# Patient Record
Sex: MALE | Race: BLACK OR AFRICAN AMERICAN | Employment: UNEMPLOYED | URBAN - METROPOLITAN AREA
[De-identification: names, ages, dates, MRNs, and addresses within clinical notes are randomized per-mention and may not be internally consistent; named-entity substitution may affect disease eponyms.]

---

## 2020-06-30 ENCOUNTER — HOSPITAL ENCOUNTER (EMERGENCY)
Age: 55
Discharge: HOME | End: 2020-06-30
Attending: EMERGENCY MEDICINE
Payer: SELF-PAY

## 2020-06-30 ENCOUNTER — APPOINTMENT (OUTPATIENT)
Dept: GENERAL RADIOLOGY | Age: 55
End: 2020-06-30
Attending: EMERGENCY MEDICINE
Payer: SELF-PAY

## 2020-06-30 ENCOUNTER — APPOINTMENT (OUTPATIENT)
Dept: CT IMAGING | Age: 55
End: 2020-06-30
Attending: EMERGENCY MEDICINE
Payer: SELF-PAY

## 2020-06-30 VITALS
DIASTOLIC BLOOD PRESSURE: 95 MMHG | TEMPERATURE: 97 F | OXYGEN SATURATION: 96 % | HEIGHT: 66 IN | HEART RATE: 81 BPM | WEIGHT: 185 LBS | RESPIRATION RATE: 16 BRPM | SYSTOLIC BLOOD PRESSURE: 149 MMHG | BODY MASS INDEX: 29.73 KG/M2

## 2020-06-30 DIAGNOSIS — I10 ESSENTIAL HYPERTENSION: Primary | ICD-10-CM

## 2020-06-30 DIAGNOSIS — R10.32 ABDOMINAL PAIN, LLQ (LEFT LOWER QUADRANT): ICD-10-CM

## 2020-06-30 LAB
ALBUMIN SERPL-MCNC: 3.6 G/DL (ref 3.4–5)
ALBUMIN/GLOB SERPL: 0.8 {RATIO} (ref 0.8–1.7)
ALP SERPL-CCNC: 159 U/L (ref 45–117)
ALT SERPL-CCNC: 37 U/L (ref 16–61)
ANION GAP SERPL CALC-SCNC: 5 MMOL/L (ref 3–18)
APPEARANCE UR: CLEAR
AST SERPL-CCNC: 27 U/L (ref 10–38)
BASOPHILS # BLD: 0 K/UL (ref 0–0.1)
BASOPHILS NFR BLD: 0 % (ref 0–2)
BILIRUB SERPL-MCNC: 0.3 MG/DL (ref 0.2–1)
BILIRUB UR QL: NEGATIVE
BUN SERPL-MCNC: 13 MG/DL (ref 7–18)
BUN/CREAT SERPL: 12 (ref 12–20)
CALCIUM SERPL-MCNC: 9.1 MG/DL (ref 8.5–10.1)
CHLORIDE SERPL-SCNC: 111 MMOL/L (ref 100–111)
CO2 SERPL-SCNC: 25 MMOL/L (ref 21–32)
COLOR UR: YELLOW
CREAT SERPL-MCNC: 1.07 MG/DL (ref 0.6–1.3)
DIFFERENTIAL METHOD BLD: ABNORMAL
EOSINOPHIL # BLD: 0.3 K/UL (ref 0–0.4)
EOSINOPHIL NFR BLD: 5 % (ref 0–5)
ERYTHROCYTE [DISTWIDTH] IN BLOOD BY AUTOMATED COUNT: 16 % (ref 11.6–14.5)
GLOBULIN SER CALC-MCNC: 4.6 G/DL (ref 2–4)
GLUCOSE SERPL-MCNC: 101 MG/DL (ref 74–99)
GLUCOSE UR STRIP.AUTO-MCNC: NEGATIVE MG/DL
HCT VFR BLD AUTO: 39.1 % (ref 36–48)
HGB BLD-MCNC: 12.9 G/DL (ref 13–16)
HGB UR QL STRIP: NEGATIVE
KETONES UR QL STRIP.AUTO: NEGATIVE MG/DL
LEUKOCYTE ESTERASE UR QL STRIP.AUTO: NEGATIVE
LIPASE SERPL-CCNC: 105 U/L (ref 73–393)
LYMPHOCYTES # BLD: 1.7 K/UL (ref 0.9–3.6)
LYMPHOCYTES NFR BLD: 31 % (ref 21–52)
MCH RBC QN AUTO: 24.3 PG (ref 24–34)
MCHC RBC AUTO-ENTMCNC: 33 G/DL (ref 31–37)
MCV RBC AUTO: 73.8 FL (ref 74–97)
MONOCYTES # BLD: 0.6 K/UL (ref 0.05–1.2)
MONOCYTES NFR BLD: 10 % (ref 3–10)
NEUTS SEG # BLD: 2.9 K/UL (ref 1.8–8)
NEUTS SEG NFR BLD: 54 % (ref 40–73)
NITRITE UR QL STRIP.AUTO: NEGATIVE
PH UR STRIP: 5.5 [PH] (ref 5–8)
PLATELET # BLD AUTO: 318 K/UL (ref 135–420)
PMV BLD AUTO: 8.8 FL (ref 9.2–11.8)
POTASSIUM SERPL-SCNC: 4.1 MMOL/L (ref 3.5–5.5)
PROT SERPL-MCNC: 8.2 G/DL (ref 6.4–8.2)
PROT UR STRIP-MCNC: NEGATIVE MG/DL
RBC # BLD AUTO: 5.3 M/UL (ref 4.7–5.5)
SODIUM SERPL-SCNC: 141 MMOL/L (ref 136–145)
SP GR UR REFRACTOMETRY: >1.03 (ref 1–1.03)
TROPONIN I SERPL-MCNC: <0.02 NG/ML (ref 0–0.04)
UROBILINOGEN UR QL STRIP.AUTO: 1 EU/DL (ref 0.2–1)
WBC # BLD AUTO: 5.4 K/UL (ref 4.6–13.2)

## 2020-06-30 PROCEDURE — 84484 ASSAY OF TROPONIN QUANT: CPT

## 2020-06-30 PROCEDURE — 74177 CT ABD & PELVIS W/CONTRAST: CPT

## 2020-06-30 PROCEDURE — 99284 EMERGENCY DEPT VISIT MOD MDM: CPT

## 2020-06-30 PROCEDURE — 85025 COMPLETE CBC W/AUTO DIFF WBC: CPT

## 2020-06-30 PROCEDURE — 93005 ELECTROCARDIOGRAM TRACING: CPT

## 2020-06-30 PROCEDURE — 74011636320 HC RX REV CODE- 636/320: Performed by: EMERGENCY MEDICINE

## 2020-06-30 PROCEDURE — 71046 X-RAY EXAM CHEST 2 VIEWS: CPT

## 2020-06-30 PROCEDURE — 81003 URINALYSIS AUTO W/O SCOPE: CPT

## 2020-06-30 PROCEDURE — 83690 ASSAY OF LIPASE: CPT

## 2020-06-30 PROCEDURE — 80053 COMPREHEN METABOLIC PANEL: CPT

## 2020-06-30 RX ORDER — LISINOPRIL AND HYDROCHLOROTHIAZIDE 12.5; 2 MG/1; MG/1
1 TABLET ORAL DAILY
Qty: 30 TAB | Refills: 2 | Status: SHIPPED | OUTPATIENT
Start: 2020-06-30 | End: 2020-07-21

## 2020-06-30 RX ADMIN — IOPAMIDOL 100 ML: 612 INJECTION, SOLUTION INTRAVENOUS at 12:27

## 2020-06-30 NOTE — ED PROVIDER NOTES
EMERGENCY DEPARTMENT HISTORY AND PHYSICAL EXAM    Date: 6/30/2020  Patient Name: Nirav Milan    History of Presenting Illness     Chief Complaint   Patient presents with    Hypertension         History Provided By: Patient    Additional History (Context): Nirav Milan is a 60 y.o. male with hypertension who presents with length of worsening left lower quadrant abdominal pain for the past several weeks. Had a left inguinal hernia repair performed approximately 2 months ago. Denies nausea vomiting diarrhea melena hematochezia or dysuria. Patient also reports being out of his blood pressure medications for a week and a half and does not remember the name of the medications he was prescribed and dispensed from the penal system where he has been incarcerated for 40 years. PCP: None        Past History     Past Medical History:  Past Medical History:   Diagnosis Date    Hypertension        Past Surgical History:  Past Surgical History:   Procedure Laterality Date    HX ORTHOPAEDIC      right arm       Family History:  No family history on file. Social History:  Social History     Tobacco Use    Smoking status: Heavy Tobacco Smoker     Packs/day: 0.50   Substance Use Topics    Alcohol use: Yes     Alcohol/week: 5.0 standard drinks     Types: 5 Cans of beer per week    Drug use: No       Allergies:  No Known Allergies      Review of Systems   Review of Systems   Constitutional: Negative for fatigue and fever. Respiratory: Negative for shortness of breath. Cardiovascular: Negative for chest pain. Gastrointestinal: Positive for abdominal pain. Negative for blood in stool, diarrhea, nausea and vomiting. All Other Systems Negative  Physical Exam     Vitals:    06/30/20 0838   BP: (!) 149/95   Pulse: 81   Resp: 16   Temp: 97 °F (36.1 °C)   SpO2: 96%   Weight: 83.9 kg (185 lb)   Height: 5' 6\" (1.676 m)     Physical Exam  Vitals signs and nursing note reviewed.    Constitutional:       General: He is not in acute distress. Appearance: He is well-developed. He is not ill-appearing, toxic-appearing or diaphoretic. HENT:      Head: Normocephalic and atraumatic. Neck:      Musculoskeletal: Normal range of motion and neck supple. Thyroid: No thyromegaly. Vascular: No carotid bruit. Trachea: No tracheal deviation. Cardiovascular:      Rate and Rhythm: Normal rate and regular rhythm. Heart sounds: Normal heart sounds. No murmur. No friction rub. No gallop. Pulmonary:      Effort: Pulmonary effort is normal. No respiratory distress. Breath sounds: Normal breath sounds. No stridor. No wheezing or rales. Chest:      Chest wall: No tenderness. Abdominal:      General: There is no distension. Palpations: Abdomen is soft. There is no mass. Tenderness: There is abdominal tenderness. There is no guarding or rebound. Comments: llq ttp   Musculoskeletal: Normal range of motion. Skin:     General: Skin is warm and dry. Coloration: Skin is not pale. Neurological:      Mental Status: He is alert. Psychiatric:         Speech: Speech normal.         Behavior: Behavior normal.         Thought Content: Thought content normal.         Judgment: Judgment normal.            Diagnostic Study Results     Labs -     Recent Results (from the past 12 hour(s))   METABOLIC PANEL, COMPREHENSIVE    Collection Time: 06/30/20 11:02 AM   Result Value Ref Range    Sodium 141 136 - 145 mmol/L    Potassium 4.1 3.5 - 5.5 mmol/L    Chloride 111 100 - 111 mmol/L    CO2 25 21 - 32 mmol/L    Anion gap 5 3.0 - 18 mmol/L    Glucose 101 (H) 74 - 99 mg/dL    BUN 13 7.0 - 18 MG/DL    Creatinine 1.07 0.6 - 1.3 MG/DL    BUN/Creatinine ratio 12 12 - 20      GFR est AA >60 >60 ml/min/1.73m2    GFR est non-AA >60 >60 ml/min/1.73m2    Calcium 9.1 8.5 - 10.1 MG/DL    Bilirubin, total 0.3 0.2 - 1.0 MG/DL    ALT (SGPT) 37 16 - 61 U/L    AST (SGOT) 27 10 - 38 U/L    Alk.  phosphatase 159 (H) 45 - 117 U/L Protein, total 8.2 6.4 - 8.2 g/dL    Albumin 3.6 3.4 - 5.0 g/dL    Globulin 4.6 (H) 2.0 - 4.0 g/dL    A-G Ratio 0.8 0.8 - 1.7     CBC WITH AUTOMATED DIFF    Collection Time: 06/30/20 11:02 AM   Result Value Ref Range    WBC 5.4 4.6 - 13.2 K/uL    RBC 5.30 4.70 - 5.50 M/uL    HGB 12.9 (L) 13.0 - 16.0 g/dL    HCT 39.1 36.0 - 48.0 %    MCV 73.8 (L) 74.0 - 97.0 FL    MCH 24.3 24.0 - 34.0 PG    MCHC 33.0 31.0 - 37.0 g/dL    RDW 16.0 (H) 11.6 - 14.5 %    PLATELET 678 839 - 774 K/uL    MPV 8.8 (L) 9.2 - 11.8 FL    NEUTROPHILS 54 40 - 73 %    LYMPHOCYTES 31 21 - 52 %    MONOCYTES 10 3 - 10 %    EOSINOPHILS 5 0 - 5 %    BASOPHILS 0 0 - 2 %    ABS. NEUTROPHILS 2.9 1.8 - 8.0 K/UL    ABS. LYMPHOCYTES 1.7 0.9 - 3.6 K/UL    ABS. MONOCYTES 0.6 0.05 - 1.2 K/UL    ABS. EOSINOPHILS 0.3 0.0 - 0.4 K/UL    ABS.  BASOPHILS 0.0 0.0 - 0.1 K/UL    DF AUTOMATED     TROPONIN I    Collection Time: 06/30/20 11:02 AM   Result Value Ref Range    Troponin-I, QT <0.02 0.0 - 0.045 NG/ML   LIPASE    Collection Time: 06/30/20 11:02 AM   Result Value Ref Range    Lipase 105 73 - 393 U/L   EKG, 12 LEAD, INITIAL    Collection Time: 06/30/20 11:07 AM   Result Value Ref Range    Ventricular Rate 70 BPM    Atrial Rate 70 BPM    P-R Interval 194 ms    QRS Duration 110 ms    Q-T Interval 398 ms    QTC Calculation (Bezet) 429 ms    Calculated P Axis 69 degrees    Calculated R Axis 12 degrees    Calculated T Axis 25 degrees    Diagnosis       Normal sinus rhythm  Incomplete right bundle branch block  Borderline ECG  No previous ECGs available     URINALYSIS W/ RFLX MICROSCOPIC    Collection Time: 06/30/20 12:14 PM   Result Value Ref Range    Color YELLOW      Appearance CLEAR      Specific gravity >1.030 (H) 1.005 - 1.030    pH (UA) 5.5 5.0 - 8.0      Protein Negative NEG mg/dL    Glucose Negative NEG mg/dL    Ketone Negative NEG mg/dL    Bilirubin Negative NEG      Blood Negative NEG      Urobilinogen 1.0 0.2 - 1.0 EU/dL    Nitrites Negative NEG      Leukocyte Esterase Negative NEG         Radiologic Studies -   CT ABD PELV W CONT   Final Result   Addendum 1 of 1   Addendum: Addendum:      As mentioned in the initial report but not in impression 2 indeterminate    right   lung nodules. Recommend comparison to the prior study, if any, or chest CT    for   full evaluation. Final      XR CHEST PA LAT   Final Result   IMPRESSION:      No confluent consolidation. Minimal streaky opacity left lung base likely   representing atelectasis. Potential granulomas bilaterally.   -However given no comparison studies currently available, further evaluation   with nonemergent CT outpatient may be helpful as clinically warranted. CT Results  (Last 48 hours)    None        CXR Results  (Last 48 hours)               06/30/20 1024  XR CHEST PA LAT Final result    Impression:  IMPRESSION:       No confluent consolidation. Minimal streaky opacity left lung base likely   representing atelectasis. Potential granulomas bilaterally.   -However given no comparison studies currently available, further evaluation   with nonemergent CT outpatient may be helpful as clinically warranted. Narrative:  Chest PA and lateral       INDICATION: Shortness of breath       COMPARISON: None       FINDINGS:   Two views of the chest were obtained. No confluent consolidation. Streaky   opacity left lung base more typical for atelectasis than infiltrate. Subcentimeter ovoid nodules in the right upper as well as left lower lung field   may represent granulomas. However given no comparison studies currently   available, further evaluation with nonemergent CT outpatient may be helpful as   clinically warranted. Cardiac silhouette is normal. Pulmonary vasculature is   unremarkable. Osseous structures are intact. Medical Decision Making   I am the first provider for this patient.     I reviewed the vital signs, available nursing notes, past medical history, past surgical history, family history and social history. Vital Signs-Reviewed the patient's vital signs. Procedures:  Procedures    Provider Notes (Medical Decision Making):      has filled Rx for his HTN meds. Diverticular disease but no inflammation; left inguinal hernia repair shows no ischemia or obstruction or perforation. D/c home. MED RECONCILIATION:  No current facility-administered medications for this encounter. Current Outpatient Medications   Medication Sig    lisinopril-hydroCHLOROthiazide (PRINZIDE, ZESTORETIC) 20-12.5 mg per tablet Take 1 Tab by mouth daily. Disposition:  home    DISCHARGE NOTE:   3:17 PM    Pt has been reexamined. Patient has no new complaints, changes, or physical findings. Care plan outlined and precautions discussed. Results of labs, CXR, CT were reviewed with the patient. All medications were reviewed with the patient; will d/c home with see below. All of pt's questions and concerns were addressed. Patient was instructed and agrees to follow up with PCP, as well as to return to the ED upon further deterioration. Patient is ready to go home. Follow-up Information     Follow up With Specialties Details Why [address]  On 7/2/2020 Primary Care follow-up at 9:00 am [address] [facility] [address],   [address] [place] [address] [phone]    [facility] EMERGENCY DEPT Emergency Medicine  If symptoms worsen return immediately [address] [place] [id number]  [phone]          Current Discharge Medication List      START taking these medications    Details   lisinopril-hydroCHLOROthiazide (PRINZIDE, ZESTORETIC) 20-12.5 mg per tablet Take 1 Tab by mouth daily. Qty: 30 Tab, Refills: 2                 Clinical Impression:   1. Essential hypertension    2.  Abdominal pain, LLQ (left lower quadrant)

## 2020-06-30 NOTE — ED NOTES
noticed in patient's hospital chart patient's insurance and PCP was not listed.       went into [facility] to speak with patient. 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      and patient spoke about [facility], [facility] and [facility]. Patient was given [facility] [place] and [facility] phone number and web site       contacted [facility] to help patient get established with a PCP.  was able to print patient application for eligibility. Patient was handed application,day and time patient can  walk into office.       also handed patient SNAP application with  contact information.        provided patient with Indigent Voucher for one prescription and faxed it over to the [employer]. Patient received 's contact information.

## 2020-06-30 NOTE — DISCHARGE INSTRUCTIONS
Patient Education        DASH Diet: Care Instructions  Your Care Instructions     The DASH diet is an eating plan that can help lower your blood pressure. DASH stands for Dietary Approaches to Stop Hypertension. Hypertension is high blood pressure. The DASH diet focuses on eating foods that are high in calcium, potassium, and magnesium. These nutrients can lower blood pressure. The foods that are highest in these nutrients are fruits, vegetables, low-fat dairy products, nuts, seeds, and legumes. But taking calcium, potassium, and magnesium supplements instead of eating foods that are high in those nutrients does not have the same effect. The DASH diet also includes whole grains, fish, and poultry. The DASH diet is one of several lifestyle changes your doctor may recommend to lower your high blood pressure. Your doctor may also want you to decrease the amount of sodium in your diet. Lowering sodium while following the DASH diet can lower blood pressure even further than just the DASH diet alone. Follow-up care is a key part of your treatment and safety. Be sure to make and go to all appointments, and call your doctor if you are having problems. It's also a good idea to know your test results and keep a list of the medicines you take. How can you care for yourself at home? Following the DASH diet  · Eat 4 to 5 servings of fruit each day. A serving is 1 medium-sized piece of fruit, ½ cup chopped or canned fruit, 1/4 cup dried fruit, or 4 ounces (½ cup) of fruit juice. Choose fruit more often than fruit juice. · Eat 4 to 5 servings of vegetables each day. A serving is 1 cup of lettuce or raw leafy vegetables, ½ cup of chopped or cooked vegetables, or 4 ounces (½ cup) of vegetable juice. Choose vegetables more often than vegetable juice. · Get 2 to 3 servings of low-fat and fat-free dairy each day. A serving is 8 ounces of milk, 1 cup of yogurt, or 1 ½ ounces of cheese. · Eat 6 to 8 servings of grains each day.  A serving is 1 slice of bread, 1 ounce of dry cereal, or ½ cup of cooked rice, pasta, or cooked cereal. Try to choose whole-grain products as much as possible. · Limit lean meat, poultry, and fish to 2 servings each day. A serving is 3 ounces, about the size of a deck of cards. · Eat 4 to 5 servings of nuts, seeds, and legumes (cooked dried beans, lentils, and split peas) each week. A serving is 1/3 cup of nuts, 2 tablespoons of seeds, or ½ cup of cooked beans or peas. · Limit fats and oils to 2 to 3 servings each day. A serving is 1 teaspoon of vegetable oil or 2 tablespoons of salad dressing. · Limit sweets and added sugars to 5 servings or less a week. A serving is 1 tablespoon jelly or jam, ½ cup sorbet, or 1 cup of lemonade. · Eat less than 2,300 milligrams (mg) of sodium a day. If you limit your sodium to 1,500 mg a day, you can lower your blood pressure even more. Tips for success  · Start small. Do not try to make dramatic changes to your diet all at once. You might feel that you are missing out on your favorite foods and then be more likely to not follow the plan. Make small changes, and stick with them. Once those changes become habit, add a few more changes. · Try some of the following:  ? Make it a goal to eat a fruit or vegetable at every meal and at snacks. This will make it easy to get the recommended amount of fruits and vegetables each day. ? Try yogurt topped with fruit and nuts for a snack or healthy dessert. ? Add lettuce, tomato, cucumber, and onion to sandwiches. ? Combine a ready-made pizza crust with low-fat mozzarella cheese and lots of vegetable toppings. Try using tomatoes, squash, spinach, broccoli, carrots, cauliflower, and onions. ? Have a variety of cut-up vegetables with a low-fat dip as an appetizer instead of chips and dip. ? Sprinkle sunflower seeds or chopped almonds over salads. Or try adding chopped walnuts or almonds to cooked vegetables.   ? Try some vegetarian meals using beans and peas. Add garbanzo or kidney beans to salads. Make burritos and tacos with mashed richmond beans or black beans. Where can you learn more? Go to http://www.mattson.com/  Enter H967 in the search box to learn more about \"DASH Diet: Care Instructions. \"  Current as of: December 16, 2019               Content Version: 12.5  © 1497-0549 [employer]. Care instructions adapted under license by [employer] (which disclaims liability or warranty for this information). If you have questions about a medical condition or this instruction, always ask your healthcare professional. [address] any warranty or liability for your use of this information.

## 2020-07-01 LAB
ATRIAL RATE: 70 BPM
CALCULATED P AXIS, ECG09: 69 DEGREES
CALCULATED R AXIS, ECG10: 12 DEGREES
CALCULATED T AXIS, ECG11: 25 DEGREES
DIAGNOSIS, 93000: NORMAL
P-R INTERVAL, ECG05: 194 MS
Q-T INTERVAL, ECG07: 398 MS
QRS DURATION, ECG06: 110 MS
QTC CALCULATION (BEZET), ECG08: 429 MS
VENTRICULAR RATE, ECG03: 70 BPM

## 2020-07-21 ENCOUNTER — APPOINTMENT (OUTPATIENT)
Dept: GENERAL RADIOLOGY | Age: 55
End: 2020-07-21
Attending: EMERGENCY MEDICINE

## 2020-07-21 ENCOUNTER — HOSPITAL ENCOUNTER (EMERGENCY)
Age: 55
Discharge: HOME | End: 2020-07-21
Attending: EMERGENCY MEDICINE

## 2020-07-21 VITALS
HEART RATE: 59 BPM | DIASTOLIC BLOOD PRESSURE: 82 MMHG | TEMPERATURE: 98.5 F | SYSTOLIC BLOOD PRESSURE: 127 MMHG | RESPIRATION RATE: 17 BRPM | OXYGEN SATURATION: 97 %

## 2020-07-21 DIAGNOSIS — R11.0 NAUSEA WITHOUT VOMITING: Primary | ICD-10-CM

## 2020-07-21 LAB
ALBUMIN SERPL-MCNC: 3.9 G/DL (ref 3.4–5)
ALBUMIN/GLOB SERPL: 0.8 {RATIO} (ref 0.8–1.7)
ALP SERPL-CCNC: 150 U/L (ref 45–117)
ALT SERPL-CCNC: 34 U/L (ref 16–61)
ANION GAP SERPL CALC-SCNC: 6 MMOL/L (ref 3–18)
AST SERPL-CCNC: 24 U/L (ref 10–38)
BASOPHILS # BLD: 0 K/UL (ref 0–0.1)
BASOPHILS NFR BLD: 0 % (ref 0–2)
BILIRUB SERPL-MCNC: 0.3 MG/DL (ref 0.2–1)
BUN SERPL-MCNC: 21 MG/DL (ref 7–18)
BUN/CREAT SERPL: 16 (ref 12–20)
CALCIUM SERPL-MCNC: 9.3 MG/DL (ref 8.5–10.1)
CHLORIDE SERPL-SCNC: 109 MMOL/L (ref 100–111)
CK MB CFR SERPL CALC: 0.4 % (ref 0–4)
CK MB SERPL-MCNC: 2.8 NG/ML (ref 5–25)
CK SERPL-CCNC: 689 U/L (ref 39–308)
CO2 SERPL-SCNC: 25 MMOL/L (ref 21–32)
CREAT SERPL-MCNC: 1.32 MG/DL (ref 0.6–1.3)
DIFFERENTIAL METHOD BLD: ABNORMAL
EOSINOPHIL # BLD: 0.3 K/UL (ref 0–0.4)
EOSINOPHIL NFR BLD: 4 % (ref 0–5)
ERYTHROCYTE [DISTWIDTH] IN BLOOD BY AUTOMATED COUNT: 15.7 % (ref 11.6–14.5)
GLOBULIN SER CALC-MCNC: 5 G/DL (ref 2–4)
GLUCOSE SERPL-MCNC: 95 MG/DL (ref 74–99)
HCT VFR BLD AUTO: 41 % (ref 36–48)
HGB BLD-MCNC: 13.4 G/DL (ref 13–16)
LYMPHOCYTES # BLD: 2.7 K/UL (ref 0.9–3.6)
LYMPHOCYTES NFR BLD: 37 % (ref 21–52)
MCH RBC QN AUTO: 23.9 PG (ref 24–34)
MCHC RBC AUTO-ENTMCNC: 32.7 G/DL (ref 31–37)
MCV RBC AUTO: 73.2 FL (ref 74–97)
MONOCYTES # BLD: 0.6 K/UL (ref 0.05–1.2)
MONOCYTES NFR BLD: 9 % (ref 3–10)
NEUTS SEG # BLD: 3.7 K/UL (ref 1.8–8)
NEUTS SEG NFR BLD: 50 % (ref 40–73)
PLATELET # BLD AUTO: 293 K/UL (ref 135–420)
PMV BLD AUTO: 8.9 FL (ref 9.2–11.8)
POTASSIUM SERPL-SCNC: 4.2 MMOL/L (ref 3.5–5.5)
PROT SERPL-MCNC: 8.9 G/DL (ref 6.4–8.2)
RBC # BLD AUTO: 5.6 M/UL (ref 4.7–5.5)
SODIUM SERPL-SCNC: 140 MMOL/L (ref 136–145)
TROPONIN I BLD-MCNC: <0.04 NG/ML (ref 0–0.08)
TROPONIN I SERPL-MCNC: <0.02 NG/ML (ref 0–0.04)
WBC # BLD AUTO: 7.4 K/UL (ref 4.6–13.2)

## 2020-07-21 PROCEDURE — 99283 EMERGENCY DEPT VISIT LOW MDM: CPT

## 2020-07-21 PROCEDURE — 84484 ASSAY OF TROPONIN QUANT: CPT

## 2020-07-21 PROCEDURE — 71045 X-RAY EXAM CHEST 1 VIEW: CPT

## 2020-07-21 PROCEDURE — 82550 ASSAY OF CK (CPK): CPT

## 2020-07-21 PROCEDURE — 80053 COMPREHEN METABOLIC PANEL: CPT

## 2020-07-21 PROCEDURE — 93005 ELECTROCARDIOGRAM TRACING: CPT

## 2020-07-21 PROCEDURE — 85025 COMPLETE CBC W/AUTO DIFF WBC: CPT

## 2020-07-21 RX ORDER — LISINOPRIL AND HYDROCHLOROTHIAZIDE 12.5; 2 MG/1; MG/1
1 TABLET ORAL DAILY
Qty: 30 TAB | Refills: 2 | Status: SHIPPED | OUTPATIENT
Start: 2020-07-21 | End: 2020-11-18

## 2020-07-21 NOTE — ED PROVIDER NOTES
EMERGENCY DEPARTMENT HISTORY AND PHYSICAL EXAM  This was created with voice recognition software and transcription errors may be present. 4:53 PM  Date: 7/21/2020  Patient Name: Gloria Riddle    History of Presenting Illness     Chief Complaint:    History Provided By:     HPI: Gloria Riddle is a 60 y.o. male past medical history of hypertension who presents for refill of his medication. Patient notes that for the past few days he has been feeling nauseated. He has been feeling generally weak at times. He has diaphoresis. Nonexertional.  No chest pain no shortness of breath no fevers no chills. Patient attributes this to the fact that he is running low on his blood pressure medications. No abdominal pain. No dysuria no diarrhea. PCP: None      Past History     Past Medical History:  Past Medical History:   Diagnosis Date    Hypertension        Past Surgical History:  Past Surgical History:   Procedure Laterality Date    HX ORTHOPAEDIC      right arm       Family History:  No family history on file. Social History:  Social History     Tobacco Use    Smoking status: Heavy Tobacco Smoker     Packs/day: 0.50   Substance Use Topics    Alcohol use: Yes     Alcohol/week: 5.0 standard drinks     Types: 5 Cans of beer per week    Drug use: No       Allergies:  No Known Allergies    Review of Systems     Review of Systems   All other systems reviewed and are negative. 10 point review of systems otherwise negative unless noted in HPI. Physical Exam       Physical Exam  Constitutional:       Appearance: He is well-developed. HENT:      Head: Normocephalic and atraumatic. Eyes:      Pupils: Pupils are equal, round, and reactive to light. Neck:      Musculoskeletal: Normal range of motion and neck supple. Cardiovascular:      Rate and Rhythm: Normal rate and regular rhythm. Heart sounds: Normal heart sounds. No murmur. No friction rub.    Pulmonary:      Effort: Pulmonary effort is normal. No respiratory distress. Breath sounds: Normal breath sounds. No wheezing. Abdominal:      General: There is no distension. Palpations: Abdomen is soft. Tenderness: There is no abdominal tenderness. There is no guarding or rebound. Musculoskeletal: Normal range of motion. Skin:     General: Skin is warm and dry. Neurological:      Mental Status: He is alert and oriented to person, place, and time. Psychiatric:         Behavior: Behavior normal.         Thought Content: Thought content normal.         Diagnostic Study Results     Vital Signs   Visit Vitals  /82 (BP 1 Location: Left arm, BP Patient Position: Sitting)   Pulse (!) 59   Temp 98.5 °F (36.9 °C)   Resp 17   SpO2 97%      EKG: EKG shows sinus at 74 with a normal axis normal intervals there is no ST elevation or depression and no hypertrophy  Labs: CBC is unremarkable chemistry unremarkable mild FUAD with approximately baseline. Trope negative delta negative mild CK elevation. Imaging: cxr neg. Medical Decision Making     ED Course: Progress Notes, Reevaluation, and Consults:      Provider Notes (Medical Decision Making): Is having some nausea as well as diaphoresis but nonexertional.  He attributes this to running low on his blood pressure medication however that may or may not be the case. We will check his basic labs EKG x-ray troponin and then reassess. Etiology unclear with patient with weakness and sweating. Trope negative we will refill patient's medications. Advised follow-up and gave him contact for a PCP. Advised patient if he develops any chest pain whatsoever please return here       Diagnosis     Clinical Impression: No diagnosis found. Disposition:    Patient's Medications   Start Taking    No medications on file   Continue Taking    LISINOPRIL-HYDROCHLOROTHIAZIDE (PRINZIDE, ZESTORETIC) 20-12.5 MG PER TABLET    Take 1 Tab by mouth daily.    These Medications have changed    No medications on file   Stop Taking    No medications on file

## 2020-07-22 LAB
ATRIAL RATE: 74 BPM
CALCULATED P AXIS, ECG09: 74 DEGREES
CALCULATED R AXIS, ECG10: 31 DEGREES
CALCULATED T AXIS, ECG11: 34 DEGREES
DIAGNOSIS, 93000: NORMAL
P-R INTERVAL, ECG05: 190 MS
Q-T INTERVAL, ECG07: 386 MS
QRS DURATION, ECG06: 112 MS
QTC CALCULATION (BEZET), ECG08: 428 MS
VENTRICULAR RATE, ECG03: 74 BPM

## 2020-10-14 ENCOUNTER — HOSPITAL ENCOUNTER (EMERGENCY)
Age: 55
Discharge: HOME | End: 2020-10-14
Attending: EMERGENCY MEDICINE
Payer: MEDICAID

## 2020-10-14 VITALS
OXYGEN SATURATION: 98 % | RESPIRATION RATE: 16 BRPM | DIASTOLIC BLOOD PRESSURE: 95 MMHG | SYSTOLIC BLOOD PRESSURE: 169 MMHG | TEMPERATURE: 98.4 F | HEART RATE: 78 BPM

## 2020-10-14 DIAGNOSIS — Z76.0 MEDICATION REFILL: Primary | ICD-10-CM

## 2020-10-14 PROCEDURE — 75810000275 HC EMERGENCY DEPT VISIT NO LEVEL OF CARE

## 2020-10-14 NOTE — ED PROVIDER NOTES
EMERGENCY DEPARTMENT HISTORY AND PHYSICAL EXAM    Date: 10/14/2020  Patient Name: Pasquale Blanc    History of Presenting Illness     Chief Complaint   Patient presents with    Medication Refill         History Provided By: Patient  Additional History (Context): Pasquale Blanc is a 60 y.o. male with hypertension who presents with confusion over where he should be this morning. 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 After speaking with the [facility] , Ilda Salgado, pt is to have a telehealth appointment with MAT at 10:30a to discuss his meds. Pt should be back at his residence, [address] [place] [address] where the [facility]" of the [facility] system can help him w/the call. Pt has no new symptoms. Have arranged Lyft ride back to residence so he may take the call. PCP: None    Current Outpatient Medications   Medication Sig Dispense Refill    lisinopril-hydroCHLOROthiazide (PRINZIDE, ZESTORETIC) 20-12.5 mg per tablet Take 1 Tab by mouth daily. 30 Tab 2       Past History     Past Medical History:  Past Medical History:   Diagnosis Date    Hypertension        Past Surgical History:  Past Surgical History:   Procedure Laterality Date    HX ORTHOPAEDIC      right arm       Family History:  No family history on file. Social History:  Social History     Tobacco Use    Smoking status: Heavy Tobacco Smoker     Packs/day: 0.50   Substance Use Topics    Alcohol use: Yes     Alcohol/week: 5.0 standard drinks     Types: 5 Cans of beer per week    Drug use: No       Allergies:  No Known Allergies      Review of Systems   Review of Systems   Constitutional: Negative for fever. Respiratory: Negative for shortness of breath. Cardiovascular: Negative for chest pain. Gastrointestinal: Negative for abdominal pain.      All Other Systems Negative  Physical Exam     Vitals:    10/14/20 0900   BP: (!) 169/95   Pulse: 78   Resp: 16   Temp: 98.4 °F (36.9 °C)   SpO2: 98%     Physical Exam  Vitals signs and nursing note reviewed. Constitutional:       General: He is not in acute distress. Appearance: He is well-developed. He is not ill-appearing, toxic-appearing or diaphoretic. HENT:      Head: Normocephalic and atraumatic. Neck:      Musculoskeletal: Normal range of motion and neck supple. Thyroid: No thyromegaly. Vascular: No carotid bruit. Trachea: No tracheal deviation. Cardiovascular:      Rate and Rhythm: Normal rate and regular rhythm. Heart sounds: Normal heart sounds. No murmur. No friction rub. No gallop. Pulmonary:      Effort: Pulmonary effort is normal. No respiratory distress. Breath sounds: Normal breath sounds. No stridor. No wheezing or rales. Chest:      Chest wall: No tenderness. Abdominal:      General: There is no distension. Palpations: Abdomen is soft. There is no mass. Tenderness: There is no abdominal tenderness. There is no guarding or rebound. Musculoskeletal: Normal range of motion. Skin:     General: Skin is warm and dry. Coloration: Skin is not pale. Neurological:      Mental Status: He is alert. Psychiatric:         Speech: Speech normal.         Behavior: Behavior normal.         Thought Content: Thought content normal.         Judgment: Judgment normal.            Diagnostic Study Results     Labs -   No results found for this or any previous visit (from the past 12 hour(s)). Radiologic Studies -   No orders to display     CT Results  (Last 48 hours)    None        CXR Results  (Last 48 hours)    None            Medical Decision Making   I am the first provider for this patient. I reviewed the vital signs, available nursing notes, past medical history, past surgical history, family history and social history. Vital Signs-Reviewed the patient's vital signs.     Procedures:  Procedures    Provider Notes (Medical Decision Making): f/u with scheduled PCP    MED RECONCILIATION:  No current facility-administered medications for this encounter. Current Outpatient Medications   Medication Sig    lisinopril-hydroCHLOROthiazide (PRINZIDE, ZESTORETIC) 20-12.5 mg per tablet Take 1 Tab by mouth daily. Disposition:  home    DISCHARGE NOTE:   9:57 AM    Pt has been reexamined. Patient has no new complaints, changes, or physical findings. Care plan outlined and precautions discussed. Results of exam were reviewed with the patient. All medications were reviewed with the patient. All of pt's questions and concerns were addressed. Patient was instructed and agrees to follow up with PCP, as well as to return to the ED upon further deterioration. Patient is ready to go home. Follow-up Information     Follow up With Specialties Details Why Contact Info    Chel Ramirez MD Internal Medicine  today at 10:30a [facility]  [phone]            Current Discharge Medication List            Diagnosis     Clinical Impression:   1.  Medication refill

## 2020-10-14 NOTE — COMMUNITY CARE MANAGEMENT
Spoke with patient, patient stated that he has an appointment here at 10:30 am, call made to [facility], patient has follow up with NP Verner Doss today 10/14/2020 at 10:30 am, this is a telehealth visit. Patient made aware.

## 2020-11-18 ENCOUNTER — HOSPITAL ENCOUNTER (EMERGENCY)
Age: 55
Discharge: HOME | End: 2020-11-18
Attending: EMERGENCY MEDICINE
Payer: COMMERCIAL

## 2020-11-18 VITALS
BODY MASS INDEX: 29.73 KG/M2 | HEART RATE: 81 BPM | OXYGEN SATURATION: 97 % | SYSTOLIC BLOOD PRESSURE: 159 MMHG | RESPIRATION RATE: 17 BRPM | TEMPERATURE: 98.4 F | DIASTOLIC BLOOD PRESSURE: 101 MMHG | HEIGHT: 66 IN | WEIGHT: 185 LBS

## 2020-11-18 DIAGNOSIS — Z76.0 MEDICATION REFILL: ICD-10-CM

## 2020-11-18 DIAGNOSIS — I10 ESSENTIAL HYPERTENSION: Primary | ICD-10-CM

## 2020-11-18 DIAGNOSIS — M19.90 ARTHRITIS: ICD-10-CM

## 2020-11-18 PROCEDURE — 99282 EMERGENCY DEPT VISIT SF MDM: CPT

## 2020-11-18 RX ORDER — FLUTICASONE PROPIONATE 50 MCG
1 SPRAY, SUSPENSION (ML) NASAL
Qty: 16 G | Refills: 0 | Status: SHIPPED | OUTPATIENT
Start: 2020-11-18 | End: 2020-11-25

## 2020-11-18 RX ORDER — LISINOPRIL AND HYDROCHLOROTHIAZIDE 12.5; 2 MG/1; MG/1
1 TABLET ORAL DAILY
Qty: 30 TAB | Refills: 0 | Status: SHIPPED | OUTPATIENT
Start: 2020-11-18

## 2020-11-18 RX ORDER — BENZONATATE 100 MG/1
100 CAPSULE ORAL
Qty: 21 CAP | Refills: 0 | Status: SHIPPED | OUTPATIENT
Start: 2020-11-18 | End: 2020-11-25

## 2020-11-18 NOTE — DISCHARGE INSTRUCTIONS
Patient Education        High Blood Pressure: Care Instructions  Overview     It's normal for blood pressure to go up and down throughout the day. But if it stays up, you have high blood pressure. Another name for high blood pressure is hypertension. Despite what a lot of people think, high blood pressure usually doesn't cause headaches or make you feel dizzy or lightheaded. It usually has no symptoms. But it does increase your risk of stroke, heart attack, and other problems. You and your doctor will talk about your risks of these problems based on your blood pressure. Your doctor will give you a goal for your blood pressure. Your goal will be based on your health and your age. Lifestyle changes, such as eating healthy and being active, are always important to help lower blood pressure. You might also take medicine to reach your blood pressure goal.  Follow-up care is a key part of your treatment and safety. Be sure to make and go to all appointments, and call your doctor if you are having problems. It's also a good idea to know your test results and keep a list of the medicines you take. How can you care for yourself at home? Medical treatment  · If you stop taking your medicine, your blood pressure will go back up. You may take one or more types of medicine to lower your blood pressure. Be safe with medicines. Take your medicine exactly as prescribed. Call your doctor if you think you are having a problem with your medicine. · Talk to your doctor before you start taking aspirin every day. Aspirin can help certain people lower their risk of a heart attack or stroke. But taking aspirin isn't right for everyone, because it can cause serious bleeding. · See your doctor regularly. You may need to see the doctor more often at first or until your blood pressure comes down.   · If you are taking blood pressure medicine, talk to your doctor before you take decongestants or anti-inflammatory medicine, such as ibuprofen. Some of these medicines can raise blood pressure. · Learn how to check your blood pressure at home. Lifestyle changes  · Stay at a healthy weight. This is especially important if you put on weight around the waist. Losing even 10 pounds can help you lower your blood pressure. · If your doctor recommends it, get more exercise. Walking is a good choice. Bit by bit, increase the amount you walk every day. Try for at least 30 minutes on most days of the week. You also may want to swim, bike, or do other activities. · Avoid or limit alcohol. Talk to your doctor about whether you can drink any alcohol. · Try to limit how much sodium you eat to less than 2,300 milligrams (mg) a day. Your doctor may ask you to try to eat less than 1,500 mg a day. · Eat plenty of fruits (such as bananas and oranges), vegetables, legumes, whole grains, and low-fat dairy products. · Lower the amount of saturated fat in your diet. Saturated fat is found in animal products such as milk, cheese, and meat. Limiting these foods may help you lose weight and also lower your risk for heart disease. · Do not smoke. Smoking increases your risk for heart attack and stroke. If you need help quitting, talk to your doctor about stop-smoking programs and medicines. These can increase your chances of quitting for good. When should you call for help? Call  911 anytime you think you may need emergency care. This may mean having symptoms that suggest that your blood pressure is causing a serious heart or blood vessel problem. Your blood pressure may be over 180/120. For example, call 911 if:    · You have symptoms of a heart attack. These may include:  ? Chest pain or pressure, or a strange feeling in the chest.  ? Sweating. ? Shortness of breath. ? Nausea or vomiting. ? Pain, pressure, or a strange feeling in the back, neck, jaw, or upper belly or in one or both shoulders or arms. ? Lightheadedness or sudden weakness.   ? A fast or irregular heartbeat.     · You have symptoms of a stroke. These may include:  ? Sudden numbness, tingling, weakness, or loss of movement in your face, arm, or leg, especially on only one side of your body. ? Sudden vision changes. ? Sudden trouble speaking. ? Sudden confusion or trouble understanding simple statements. ? Sudden problems with walking or balance. ? A sudden, severe headache that is different from past headaches.     · You have severe back or belly pain. Do not wait until your blood pressure comes down on its own. Get help right away. Call your doctor now or seek immediate care if:    · Your blood pressure is much higher than normal (such as 180/120 or higher), but you don't have symptoms.     · You think high blood pressure is causing symptoms, such as:  ? Severe headache.  ? Blurry vision. Watch closely for changes in your health, and be sure to contact your doctor if:    · Your blood pressure measures higher than your doctor recommends at least 2 times. That means the top number is higher or the bottom number is higher, or both.     · You think you may be having side effects from your blood pressure medicine. Where can you learn more? Go to http://www.gray.com/  Enter [id number] in the search box to learn more about \"High Blood Pressure: Care Instructions. \"  Current as of: December 16, 2019               Content Version: 12.6  © 4587-5563 [employer]. Care instructions adapted under license by [employer] (which disclaims liability or warranty for this information). If you have questions about a medical condition or this instruction, always ask your healthcare professional. [place] [address] any warranty or liability for your use of this information. Patient Education        Arthritis: Care Instructions  Overview  Arthritis, also called osteoarthritis, is a breakdown of the cartilage that cushions your joints.  When the cartilage wears down, your bones rub against each other. This causes pain and stiffness. Many people have some arthritis as they age. Arthritis most often affects the joints of the spine, hands, hips, knees, or feet. Arthritis never goes away completely. But medicine and home treatment can help reduce pain and help you stay active. Follow-up care is a key part of your treatment and safety. Be sure to make and go to all appointments, and call your doctor if you are having problems. It's also a good idea to know your test results and keep a list of the medicines you take. How can you care for yourself at home? · Stay at a healthy weight. Being overweight puts extra strain on your joints. · Talk to your doctor or physical therapist about exercises that will help ease joint pain. ? Stretch. You may enjoy gentle forms of yoga to help keep your joints and muscles flexible. ? Walk instead of jog. Other types of exercise that are less stressful on the joints include riding a bike, swimming, moo chi, or water exercise. ? Lift weights. Strong muscles help reduce stress on your joints. Stronger thigh muscles, for example, take some of the stress off of the knees and hips. Learn the right way to lift weights so you don't make joint pain worse. · Take your medicines exactly as prescribed. Call your doctor if you think you are having a problem with your medicine. · Take pain medicines exactly as directed. ? If the doctor gave you a prescription medicine for pain, take it as prescribed. ? If you are not taking a prescription pain medicine, ask your doctor if you can take an over-the-counter medicine. · Use a cane, crutch, walker, or another device if you need help to get around. These can help rest your joints. You also can use other things to make life easier, such as a higher toilet seat and padded handles on kitchen utensils. · Do not sit in low chairs. They can make it hard to get up.   · Put heat or cold on your sore joints as needed. Use whichever helps you most. You can also switch between hot and cold packs. ? Apply heat 2 or 3 times a day for 20 to 30 minutes--using a heating pad, hot shower, or hot pack--to relieve pain and stiffness. But don't use heat on a swollen joint. ? Put ice or a cold pack on your sore joint for 10 to 20 minutes at a time. Put a thin cloth between the ice and your skin. When should you call for help? Call your doctor now or seek immediate medical care if:    · You have sudden swelling, warmth, or pain in any joint.     · You have joint pain and a fever or rash.     · You have such bad pain that you cannot use a joint. Watch closely for changes in your health, and be sure to contact your doctor if:    · You have mild joint symptoms that continue even with more than 6 weeks of care at home.     · You have stomach pain or other problems with your medicine. Where can you learn more? Go to http://www.gray.com/  Enter G801 in the search box to learn more about \"Arthritis: Care Instructions. \"  Current as of: December 9, 2019               Content Version: 12.6  © 2855-1467 [employer]. Care instructions adapted under license by [employer] (which disclaims liability or warranty for this information). If you have questions about a medical condition or this instruction, always ask your healthcare professional. [place] [address] any warranty or liability for your use of this information.

## 2020-11-18 NOTE — ED PROVIDER NOTES
EMERGENCY DEPARTMENT HISTORY AND PHYSICAL EXAM    10:02 AM      Date: 11/18/2020  Patient Name: Claudeen Oven    History of Presenting Illness     Chief Complaint   Patient presents with    Back Pain    Hypertension    Elbow Pain    Medication Refill         History Provided By: Patient    Additional History (Context): Claudeen Oven is a 60 y.o. male with past medical history significant for hypertension and arthritis who presents with request for medication refill for his hypertension which she has been out of for 3 weeks. Additionally he relates multiple arthralgias to the left elbow, right knee, and lower back that he has had off and on for years and worse with weather change. He was told at one point that he had arthritis. He has not been taking any over-the-counter medications for this. He denies any unexplained weight loss, fever, chills, abdominal pain, or headaches. PCP: Makenzie Ambrose NP    Current Outpatient Medications   Medication Sig Dispense Refill    lisinopril-hydroCHLOROthiazide (PRINZIDE, ZESTORETIC) 20-12.5 mg per tablet Take 1 Tab by mouth daily. 30 Tab 0       Past History     Past Medical History:  Past Medical History:   Diagnosis Date    Hypertension        Past Surgical History:  Past Surgical History:   Procedure Laterality Date    HX ORTHOPAEDIC      right arm       Family History:  No family history on file. Social History:  Social History     Tobacco Use    Smoking status: Heavy Tobacco Smoker     Packs/day: 0.50   Substance Use Topics    Alcohol use: Yes     Alcohol/week: 5.0 standard drinks     Types: 5 Cans of beer per week    Drug use: No       Allergies:  No Known Allergies      Review of Systems       Review of Systems   Constitutional: Negative. Negative for chills and fever. HENT: Negative. Negative for congestion, ear pain and rhinorrhea. Eyes: Negative. Negative for pain and redness. Respiratory: Negative. Negative for cough and shortness of breath. Cardiovascular: Negative. Negative for chest pain, palpitations and leg swelling. Elevated blood pressure   Gastrointestinal: Negative. Negative for abdominal pain, constipation, diarrhea, nausea and vomiting. Genitourinary: Negative. Negative for dysuria, frequency, hematuria and urgency. Musculoskeletal: Positive for arthralgias. Negative for back pain, gait problem, joint swelling and neck pain. Skin: Negative. Negative for rash and wound. Neurological: Negative. Negative for dizziness, seizures, speech difficulty, weakness, light-headedness and headaches. Hematological: Negative for adenopathy. Does not bruise/bleed easily. All other systems reviewed and are negative. Physical Exam     Visit Vitals  BP (!) 159/101 (BP 1 Location: Left arm, BP Patient Position: At rest)   Pulse 81   Temp 98.4 °F (36.9 °C)   Resp 17   Ht 5' 6\" (1.676 m)   Wt 83.9 kg (185 lb)   SpO2 97%   BMI 29.86 kg/m²         Physical Exam  Vitals signs and nursing note reviewed. Constitutional:       General: He is not in acute distress. Appearance: Normal appearance. He is well-developed and normal weight. He is not ill-appearing, toxic-appearing or diaphoretic. HENT:      Head: Normocephalic and atraumatic. Eyes:      Extraocular Movements: Extraocular movements intact. Pupils: Pupils are equal, round, and reactive to light. Neck:      Musculoskeletal: Normal range of motion and neck supple. No muscular tenderness. Cardiovascular:      Rate and Rhythm: Normal rate and regular rhythm. Pulses: Normal pulses. Heart sounds: Normal heart sounds. No murmur. No friction rub. No gallop. No S3 sounds. Pulmonary:      Effort: Pulmonary effort is normal. No tachypnea, accessory muscle usage or respiratory distress. Breath sounds: Normal breath sounds. No stridor. No wheezing, rhonchi or rales. Chest:      Chest wall: No tenderness. Abdominal:      General: Abdomen is flat.  Bowel sounds are normal. There is no distension. Palpations: Abdomen is soft. There is no mass or pulsatile mass. Tenderness: There is no abdominal tenderness. There is no right CVA tenderness, left CVA tenderness, guarding or rebound. Hernia: No hernia is present. Musculoskeletal: Normal range of motion. General: No swelling, tenderness, deformity or signs of injury. Right lower leg: He exhibits no tenderness. No edema. Left lower leg: He exhibits no tenderness. No edema. Lymphadenopathy:      Cervical: No cervical adenopathy. Skin:     General: Skin is warm and dry. Capillary Refill: Capillary refill takes less than 2 seconds. Findings: No erythema or rash. Neurological:      General: No focal deficit present. Mental Status: He is alert and oriented to person, place, and time. Gait: Gait is intact. Psychiatric:         Mood and Affect: Mood normal.         Behavior: Behavior normal.           Diagnostic Study Results     Labs -  No results found for this or any previous visit (from the past 12 hour(s)). Radiologic Studies -   No orders to display         Medical Decision Making   I am the first provider for this patient. I reviewed available nursing notes, past medical history, past surgical history, family history and social history. Vital Signs-Reviewed the patient's vital signs. Records Reviewed: Nursing Notes and Old Medical Records (Time of Review: 10:02 AM)    Pulse Oximetry Analysis - 97% on room air-normal      ED Course: Progress Notes, Reevaluation, and Consults:  10:02 AM  Initial assessment performed. The patients presenting problems have been discussed, and they/their family are in agreement with the care plan formulated and outlined with them. I have encouraged them to ask questions as they arise throughout their visit. 10:06 AM: Upon discharge patient asking for something for congestion.   States he has allergies and has been having a runny nose and a dry cough that is worse at night. He denies any shortness of breath, fever, chills, headache, sore throat. He does have clear rhinorrhea to the nares bilaterally which are patent. We will send Flonase and benzonatate prescriptions electronically. Provider Notes (Medical Decision Making):     Patient is a well-appearing 68-year-old male who presents to the ER requesting medication refill. He had an appointment with Dr. Josh Gonzales but this was canceled and rescheduled for next month. He has been out of his medications for 3 weeks and blood pressure was noted to be 159/101. He has no complaints of chest pain or shortness of breath and physical exam is unremarkable. Patient also has had diffuse arthralgias and was told he had arthritis while he was incarcerated for the last several years. He has not been taking any over-the-counter medications. To his knowledge he has not seen an orthopedist.  He denies any redness or swelling to the joints on physical examination has no significant abnormalities. Patient follow-up with his PCP regarding his blood pressure and arthralgias and ER return precautions were discussed at length. Patient is feeling better and ready to go home. Diagnosis     Clinical Impression:   1. Essential hypertension    2. Medication refill    3. Arthritis        Disposition: Discharged home in stable condition    DISCHARGE NOTE:     Patient has been reexamined. Patient has no new complaints, changes, or physical findings. Care plan outlined and precautions discussed. Results of blood pressure and physical exam findings were reviewed with the patient. All medications were reviewed with the patient; will discharge home with lisinopril/HCTZ. All of patient's questions and concerns were addressed. Patient was instructed and agrees to follow up with PCP, as well as to return to the ED upon further deterioration. Patient is ready to go home.     Follow-up Information     Follow up With Specialties Details Why Contact Info    Kortney Tirado MD Internal Medicine Schedule an appointment as soon as possible for a visit Follow-up from the Emergency Department [address], [address] [place] [facility] EMERGENCY DEPT Emergency Medicine  As needed, If symptoms worsen [address] [facility] [id number] [phone]           Current Discharge Medication List      CONTINUE these medications which have CHANGED    Details   lisinopril-hydroCHLOROthiazide (PRINZIDE, ZESTORETIC) 20-12.5 mg per tablet Take 1 Tab by mouth daily. Qty: 30 Tab, Refills: 0               Dictation disclaimer:  Please note that this dictation was completed with [employer], the computer voice recognition software. Quite often unanticipated grammatical, syntax, homophones, and other interpretive errors are inadvertently transcribed by the computer software. Please disregard these errors. Please excuse any errors that have escaped final proofreading.

## 2020-11-18 NOTE — ED TRIAGE NOTES
Pt states that he has been out of BP meds x 3 weeks and feels bad like his blood pressure is high. Pt states that he has an appointment with his PCP Dr. Carl Eisenmenger on 12/14. Pt also c/o left elbow pain and intermittent back pain.

## 2021-01-12 ENCOUNTER — OFFICE VISIT (OUTPATIENT)
Dept: SURGERY | Age: 56
End: 2021-01-12
Payer: COMMERCIAL

## 2021-01-12 VITALS
SYSTOLIC BLOOD PRESSURE: 134 MMHG | OXYGEN SATURATION: 98 % | BODY MASS INDEX: 27.8 KG/M2 | RESPIRATION RATE: 20 BRPM | WEIGHT: 173 LBS | HEART RATE: 86 BPM | HEIGHT: 66 IN | TEMPERATURE: 97.5 F | DIASTOLIC BLOOD PRESSURE: 90 MMHG

## 2021-01-12 DIAGNOSIS — R10.32 INGUINAL PAIN, LEFT: Primary | ICD-10-CM

## 2021-01-12 PROCEDURE — 99204 OFFICE O/P NEW MOD 45 MIN: CPT | Performed by: SURGERY

## 2021-01-12 RX ORDER — ROSUVASTATIN CALCIUM 40 MG/1
TABLET, COATED ORAL
COMMUNITY
Start: 2020-12-21

## 2021-01-12 RX ORDER — INDAPAMIDE 2.5 MG/1
TABLET, FILM COATED ORAL
COMMUNITY
Start: 2020-12-21

## 2021-01-12 RX ORDER — EPLERENONE 25 MG/1
25 TABLET, FILM COATED ORAL DAILY
COMMUNITY

## 2021-01-12 NOTE — PROGRESS NOTES
Consult    Patient: Sravani Mccarthy MRN: [id number]  SSN: [id number]    YOB: 1965  Age: 60 y.o. Sex: male      Subjective:      Sravani Mccarthy is a 60 y.o. black male presented history of a left inguinal hernia repair conducted open utilizing plug/patch technique by Dr. Yani Ma surgical May 15, 2019. Patient presents today noting recurrence of local discomfort left inguinal region after lifting approximately 100 pounds. Has not attempted any symptomatic treatment and has not noticed a bulge patient denies obstructive GI  and pulmonary symptomatology  No Known Allergies  Current Outpatient Medications   Medication Sig Dispense Refill    rosuvastatin (CRESTOR) 40 mg tablet TAKE 1 TABLET BY MOUTH EVERY DAY      indapamide (LOZOL) 2.5 mg tablet TAKE 1 TABLET BY MOUTH EVERY DAY IN THE MORNING      eplerenone (INSPRA) 25 mg tablet Take 25 mg by mouth daily.  lisinopril-hydroCHLOROthiazide (PRINZIDE, ZESTORETIC) 20-12.5 mg per tablet Take 1 Tab by mouth daily. 30 Tab 0     Past Medical History:   Diagnosis Date    Arthritis     Chronic obstructive pulmonary disease ([address]     Hypertension      Past Surgical History:   Procedure Laterality Date    HX HEENT      left inguinal hernia repair    HX ORTHOPAEDIC      right arm      Social History     Tobacco Use    Smoking status: Former Smoker     Quit date:      Years since quittin.0   Substance Use Topics    Alcohol use: Not Currently     Alcohol/week: 5.0 standard drinks     Types: 5 Cans of beer per week     No family history on file. Review of Systems:    General: Denies fevers, chills, night sweats, fatigue, weight loss, or weight gain.     HEENT: Denies changes in auditory or visual acuity, recurrent pharyngitis, epistaxis, chronic rhinorrhea, vertigo    Respiratory: Denies increasing shortness of breath, productive cough, hemoptysis    Cardiac: Denies known history of cardiac disease, heart murmur, palpitations    GI: Denies dysphagia, recurrent emesis, hematemesis, changes in bowel habits, hematochezia, melena    : Denies hematuria frequency urgency dysuria    Musculoskeletal: Denies fractures, dislocations    Neurologic: Denies history of CVA, paralysis paresthesias, recurrent cephalgia, seizures    Endocrine: Denies polyuria, polydipsia, polyphagia, heat and cold intolerance    Lymph/heme: Denies a history of malignancy, anemia, bruising, blood transfusions    Integumentary: Negative for dermatitis     Objective:     Vitals:    01/12/21 1327   BP: (!) 134/90   Pulse: 86   Resp: 20   Temp: 97.5 °F (36.4 °C)   SpO2: 98%   Weight: 78.5 kg (173 lb)   Height: 5' 6\" (1.676 m)        General: no acute distress, nontoxic in appearance. Resp: Clear to auscultation bilaterally, no wheezing, rhonchi, or rales, excursions normal and symmetrical.  Cardio: Regular rate and rhythm, no murmurs, clicks, gallops, or rubs. Abdomen: soft, nontender, nondistended, normoactive bowel sounds, no hernias. External genitalia: Circumcised penis without discharge bilateral descended testes without masses, no evidence of inguinal hernia. Psych: Alert and oriented to person, place, and time. Assessment:       Inguinal pain of musculoskeletal origin status post open plug/patch mesh repair of left inguinal hernia by Dr. Melvin Dixon surgical      Plan:     Advised the patient to avoid activities that precipitate discomfort come utilizing local physical measures such as heat and cold on a as needed basis, Motrin 800 mg 3 times daily.   Follow-up with Dr. Rush Corona if symptoms do not resolve as expected    Signed By: Leanna Gaines DO     January 12, 2021

## 2021-02-18 ENCOUNTER — TRANSCRIBE ORDER (OUTPATIENT)
Dept: SCHEDULING | Age: 56
End: 2021-02-18

## 2021-02-18 DIAGNOSIS — R07.89 OTHER CHEST PAIN: Primary | ICD-10-CM

## 2021-08-20 ENCOUNTER — APPOINTMENT (OUTPATIENT)
Dept: CT IMAGING | Age: 56
End: 2021-08-20
Attending: PHYSICIAN ASSISTANT
Payer: MEDICAID

## 2021-08-20 ENCOUNTER — HOSPITAL ENCOUNTER (EMERGENCY)
Age: 56
Discharge: HOME | End: 2021-08-20
Attending: STUDENT IN AN ORGANIZED HEALTH CARE EDUCATION/TRAINING PROGRAM
Payer: MEDICAID

## 2021-08-20 VITALS
SYSTOLIC BLOOD PRESSURE: 119 MMHG | HEIGHT: 66 IN | TEMPERATURE: 98.4 F | RESPIRATION RATE: 18 BRPM | HEART RATE: 95 BPM | DIASTOLIC BLOOD PRESSURE: 72 MMHG | OXYGEN SATURATION: 100 % | WEIGHT: 173 LBS | BODY MASS INDEX: 27.8 KG/M2

## 2021-08-20 DIAGNOSIS — M48.061 SPINAL STENOSIS OF LUMBAR REGION WITHOUT NEUROGENIC CLAUDICATION: ICD-10-CM

## 2021-08-20 DIAGNOSIS — M48.061 FORAMINAL STENOSIS OF LUMBAR REGION: ICD-10-CM

## 2021-08-20 DIAGNOSIS — M47.9 SPONDYLOSIS: Primary | ICD-10-CM

## 2021-08-20 PROCEDURE — 72131 CT LUMBAR SPINE W/O DYE: CPT

## 2021-08-20 PROCEDURE — 99282 EMERGENCY DEPT VISIT SF MDM: CPT

## 2021-08-20 RX ORDER — LIDOCAINE 50 MG/G
PATCH TOPICAL
Qty: 15 EACH | Refills: 0 | Status: SHIPPED | OUTPATIENT
Start: 2021-08-20

## 2021-08-20 RX ORDER — METHOCARBAMOL 500 MG/1
500 TABLET, FILM COATED ORAL 3 TIMES DAILY
Qty: 15 TABLET | Refills: 0 | Status: SHIPPED | OUTPATIENT
Start: 2021-08-20

## 2021-08-20 NOTE — ED TRIAGE NOTES
Pt arrived for leg numbness and pain starting three months ago. Pt reports inguinal hernia repair last year and incision site swells and hardens sometimes and leg pain worsens with the swelling.       Hx  Hypertension [I10]     Arthritis [M19.90]     Chronic obstructive pulmonary disease

## 2021-08-20 NOTE — ED PROVIDER NOTES
EMERGENCY DEPARTMENT HISTORY AND PHYSICAL EXAM      Date: 8/20/2021  Patient Name: Nick Szymanski    History of Presenting Illness     Chief Complaint   Patient presents with    Leg Pain       History Provided By: Patient    HPI: Nick Szymanski, 60 y.o. male PMHx significant for htn, COPD, arthritis  presents ambulatory to the ED. Patient reports intermittent aching low back pain that radiates down legs bilaterally x 1 year. Patient reports he was recently released from [facility] about a year and a half ago and he has been following up with PCP. Patient reports he has been seen at multiple EDs for similar symptoms and they were not able to determine the cause. Denies recent trauma or injury. Denies saddle anesthesia, loss of bowel or bladder function, IV drug use, leg weakness. Patient reports he was prescribed previous medication and is unsure name of medication. Pt reports previous medication helped relieve sx. Patient has not followed up with Ortho. Denies history of DM. There are no other complaints, changes, or physical findings at this time. PCP: Cliff Hale NP    No current facility-administered medications on file prior to encounter. Current Outpatient Medications on File Prior to Encounter   Medication Sig Dispense Refill    rosuvastatin (CRESTOR) 40 mg tablet TAKE 1 TABLET BY MOUTH EVERY DAY      indapamide (LOZOL) 2.5 mg tablet TAKE 1 TABLET BY MOUTH EVERY DAY IN THE MORNING      eplerenone (INSPRA) 25 mg tablet Take 25 mg by mouth daily.  lisinopril-hydroCHLOROthiazide (PRINZIDE, ZESTORETIC) 20-12.5 mg per tablet Take 1 Tab by mouth daily.  30 Tab 0       Past History     Past Medical History:  Past Medical History:   Diagnosis Date    Arthritis     Chronic obstructive pulmonary disease ([facility] [address]     Hypertension        Past Surgical History:  Past Surgical History:   Procedure Laterality Date    HX HEENT      left inguinal hernia repair    HX ORTHOPAEDIC      right arm       Family History:  No family history on file. Social History:  Social History     Tobacco Use    Smoking status: Former Smoker     Quit date:      Years since quittin.6   Substance Use Topics    Alcohol use: Not Currently     Alcohol/week: 5.0 standard drinks     Types: 5 Cans of beer per week    Drug use: No       Allergies:  No Known Allergies      Review of Systems   Review of Systems   Constitutional: Negative for chills and fever. HENT: Negative for facial swelling. Eyes: Negative for photophobia and visual disturbance. Respiratory: Negative for shortness of breath. Cardiovascular: Negative for chest pain. Gastrointestinal: Negative for abdominal pain, nausea and vomiting. Genitourinary: Negative for flank pain. Musculoskeletal: Positive for back pain. Skin: Negative for color change, pallor, rash and wound. Neurological: Negative for dizziness, weakness, light-headedness and headaches. All other systems reviewed and are negative. Physical Exam   Physical Exam  Vitals and nursing note reviewed. Constitutional:       General: He is not in acute distress. Appearance: He is well-developed. Comments: Pt in NAD   HENT:      Head: Normocephalic and atraumatic. Eyes:      Conjunctiva/sclera: Conjunctivae normal.   Cardiovascular:      Rate and Rhythm: Normal rate and regular rhythm. Heart sounds: Normal heart sounds. Pulmonary:      Effort: Pulmonary effort is normal. No respiratory distress. Breath sounds: Normal breath sounds. Abdominal:      General: Bowel sounds are normal.      Palpations: Abdomen is soft. Tenderness: There is no abdominal tenderness. Musculoskeletal:         General: Normal range of motion. Lumbar back: Tenderness present. No bony tenderness.       Comments: DP pulses strong and equal bilaterally  Sensation equal and intact lower extremities bilaterally  Strength 5/5 lower extremities bilaterally   Skin:     General: Skin is warm.      Findings: No rash. Neurological:      Mental Status: He is alert and oriented to person, place, and time. Cranial Nerves: No cranial nerve deficit. Psychiatric:         Behavior: Behavior normal.         Diagnostic Study Results     Labs -   No results found for this or any previous visit (from the past 12 hour(s)). Radiologic Studies -   CT SPINE LUMB WO CONT   Final Result      1. No acute compression fracture or subluxation. 2. Scoliosis and chronic spondylosis appear stable. Mild to moderate Can. And   foraminal stenosis are present. If radiculopathy present, L-spine MR is advised   for better evaluation. Thank you for your referral.             CT Results  (Last 48 hours)               08/20/21 1246  CT SPINE LUMB WO CONT Final result    Impression:      1. No acute compression fracture or subluxation. 2. Scoliosis and chronic spondylosis appear stable. Mild to moderate Can. And   foraminal stenosis are present. If radiculopathy present, L-spine MR is advised   for better evaluation. Thank you for your referral.            Narrative:  CT of the lumbar spine without contrast       HISTORY: Back pain       COMPARISON: Abdomen pelvis CT 6/30/20. TECHNIQUE: Helical axial images to the lumbar spine are obtained without   intrathecal contrast.  Sagittal and coronal reconstructions were obtained to   better evaluate alignment, disc space heights, interfacet relations and the   vertebral integrity. All CT scans at this facility performed using dose optimization techniques as   appreciated to a performed exam, to include automated exposure control,   adjustment of the mA and or KU according to patient size (including appropriate   matching for site specific examination), or use of iterative reconstruction   technique. FINDINGS:        There is mild scoliotic changes with associated moderate spondylosis, unchanged   since the prior CT in 6/20.  The vertebral column and alignment of the lumbar   spine are otherwise unremarkable. No evidence of compression fracture or   subluxation identified. The Schmorl's nodes along the superior T12, L1, L2 and   L3 appear stable. Mild to moderate facet arthropathy at mid and lower lumbar   spine with ligamentous hypertrophy present. There is mild narrowing of the   spinal canal in AP dimension in mid and the lower lumbar spine, likely   congenital. Moderate foraminal narrowing at mid and lower lumbar spine at level   of L3-4 and L4-5 are present. Imaged paraspinal region appear unremarkable. CXR Results  (Last 48 hours)    None          Medical Decision Making   I am the first provider for this patient. I reviewed the vital signs, available nursing notes, past medical history, past surgical history, family history and social history. Vital Signs-Reviewed the patient's vital signs. Patient Vitals for the past 12 hrs:   Temp Pulse Resp BP SpO2   08/20/21 1152 98.4 °F (36.9 °C) 95 18 119/72 100 %         Records Reviewed: Nursing Notes and Old Medical Records    Provider Notes (Medical Decision Making):   DDx: Lumbar strain vs arthritis, Sciatica, Neuropathy    59 yo M who presents with low back pain radiating down legs b/l x 1 year. On exam, lower back tenderness midline tenderness. CT scan shows chronic spondylosis with mild stenosis. No concerning findings including lower leg weakness, decreased pulses, saddle anesthesia, loss of bowel or bladder function. Will treat patient symptomatically and discussed need for Ortho follow-up. At time of discharge, pt non-toxic appearing in NAD. Pt stable for prompt outpatient follow-up with PCP 1 to 2 days. Patient given strict instructions to return if symptoms worsen. ED Course:   Initial assessment performed. The patients presenting problems have been discussed, and they are in agreement with the care plan formulated and outlined with them.   I have encouraged them to ask questions as they arise throughout their visit. Discussed CT scan with attending, who agreed with plan for outpatient f/u with ortho for continued management. Disposition:  Discussed imaging results with pt along with dx and treatment plan. Discussed importance of  PCP follow up. All questions answered. Pt voiced they understood. Return if sx worsen. PLAN:  1. Current Discharge Medication List      START taking these medications    Details   methocarbamoL (Robaxin) 500 mg tablet Take 1 Tablet by mouth three (3) times daily. Qty: 15 Tablet, Refills: 0  Start date: 8/20/2021      lidocaine (Lidoderm) 5 % Apply patch to the affected area for 12 hours a day and remove for 12 hours a day. Qty: 15 Each, Refills: 0  Start date: 8/20/2021           2. Follow-up Information     Follow up With Specialties Details Why Contact Info    Evert Lux MD Internal Medicine Schedule an appointment as soon as possible for a visit in 1 day  [address] [facility] [address] [facility]  Schedule an appointment as soon as possible for a visit in 1 day  [address] [facility] [address]    [facility] EMERGENCY DEPT Emergency Medicine  As needed, If symptoms worsen [address] [place] [id number]  [phone]        Return to ED if worse     Diagnosis     Clinical Impression:   1. Spondylosis    2. Spinal stenosis of lumbar region without neurogenic claudication    3. Foraminal stenosis of lumbar region        Attestations:    JOSE Lock    Please note that this dictation was completed with [employer], the computer voice recognition software. Quite often unanticipated grammatical, syntax, homophones, and other interpretive errors are inadvertently transcribed by the computer software. Please disregard these errors. Please excuse any errors that have escaped final proofreading. Thank you.

## 2021-09-15 ENCOUNTER — HOSPITAL ENCOUNTER (EMERGENCY)
Age: 56
Discharge: HOME | End: 2021-09-15
Attending: STUDENT IN AN ORGANIZED HEALTH CARE EDUCATION/TRAINING PROGRAM
Payer: MEDICAID

## 2021-09-15 VITALS
DIASTOLIC BLOOD PRESSURE: 80 MMHG | RESPIRATION RATE: 16 BRPM | OXYGEN SATURATION: 94 % | TEMPERATURE: 97.9 F | SYSTOLIC BLOOD PRESSURE: 112 MMHG | HEART RATE: 80 BPM

## 2021-09-15 DIAGNOSIS — Z20.822 CLOSE EXPOSURE TO COVID-19 VIRUS: Primary | ICD-10-CM

## 2021-09-15 PROCEDURE — 99281 EMR DPT VST MAYX REQ PHY/QHP: CPT

## 2021-09-15 NOTE — ED PROVIDER NOTES
EMERGENCY DEPARTMENT HISTORY AND PHYSICAL EXAM    Date: 9/15/2021  Patient Name: Ernie Wellington    History of Presenting Illness     Chief Complaint   Patient presents with    Covid Testing         History Provided By: Patient    Chief Complaint: Covid test, Covid exposure  Duration: Unknown  Timing: N/A  Location: N/A  Quality: Asymptomatic  Severity: Mild  Modifying Factors: None  Associated Symptoms: none       Additional History (Context): Ernie Wellington is a 60 y.o. male with a history of COPD and hypertension who presents today for issues listed above. Patient states [facility] a [facility] and was told to come get tested because there was a Covid positive contact. Patient is asymptomatic and has been fully vaccinated. PCP: Ghassan Pisano NP    Current Outpatient Medications   Medication Sig Dispense Refill    methocarbamoL (Robaxin) 500 mg tablet Take 1 Tablet by mouth three (3) times daily. 15 Tablet 0    lidocaine (Lidoderm) 5 % Apply patch to the affected area for 12 hours a day and remove for 12 hours a day. 15 Each 0    rosuvastatin (CRESTOR) 40 mg tablet TAKE 1 TABLET BY MOUTH EVERY DAY      indapamide (LOZOL) 2.5 mg tablet TAKE 1 TABLET BY MOUTH EVERY DAY IN THE MORNING      eplerenone (INSPRA) 25 mg tablet Take 25 mg by mouth daily.  lisinopril-hydroCHLOROthiazide (PRINZIDE, ZESTORETIC) 20-12.5 mg per tablet Take 1 Tab by mouth daily. 30 Tab 0       Past History     Past Medical History:  Past Medical History:   Diagnosis Date    Arthritis     Chronic obstructive pulmonary disease ([address]     Hypertension        Past Surgical History:  Past Surgical History:   Procedure Laterality Date    HX HEENT      left inguinal hernia repair    HX ORTHOPAEDIC      right arm       Family History:  No family history on file.     Social History:  Social History     Tobacco Use    Smoking status: Former Smoker     Quit date:      Years since quittin.7   Substance Use Topics    Alcohol use: Not Currently     Alcohol/week: 5.0 standard drinks     Types: 5 Cans of beer per week    Drug use: No       Allergies:  No Known Allergies      Review of Systems   Review of Systems   Constitutional: Negative for chills and fever. HENT: Negative for congestion, rhinorrhea and sore throat. Respiratory: Negative for cough and shortness of breath. Cardiovascular: Negative for chest pain. Gastrointestinal: Negative for abdominal pain, blood in stool, constipation, diarrhea, nausea and vomiting. Genitourinary: Negative for dysuria, frequency and hematuria. Musculoskeletal: Negative for back pain and myalgias. Skin: Negative for rash and wound. Neurological: Negative for dizziness and headaches. All other systems reviewed and are negative. All Other Systems Negative  Physical Exam     Vitals:    09/15/21 1601   BP: 112/80   Pulse: 80   Resp: 16   Temp: 97.9 °F (36.6 °C)   SpO2: 94%     Physical Exam  Vitals and nursing note reviewed. Constitutional:       General: He is not in acute distress. Appearance: He is well-developed. He is not diaphoretic. HENT:      Head: Normocephalic and atraumatic. Eyes:      Conjunctiva/sclera: Conjunctivae normal.   Cardiovascular:      Rate and Rhythm: Normal rate and regular rhythm. Heart sounds: Normal heart sounds. Pulmonary:      Effort: Pulmonary effort is normal. No respiratory distress. Breath sounds: Normal breath sounds. Chest:      Chest wall: No tenderness. Abdominal:      General: Bowel sounds are normal. There is no distension. Palpations: Abdomen is soft. Tenderness: There is no abdominal tenderness. There is no guarding or rebound. Musculoskeletal:         General: No deformity. Normal range of motion. Cervical back: Normal range of motion and neck supple. Skin:     General: Skin is warm and dry.    Neurological:      Mental Status: He is alert and oriented to person, place, and time.           Diagnostic Study Results     Labs -   No results found for this or any previous visit (from the past 12 hour(s)). Radiologic Studies -   No orders to display     CT Results  (Last 48 hours)    None        CXR Results  (Last 48 hours)    None            Medical Decision Making   I am the first provider for this patient. I reviewed the vital signs, available nursing notes, past medical history, past surgical history, family history and social history. Vital Signs-Reviewed the patient's vital signs. Records Reviewed: Nursing Notes and Old Medical Records     Procedures: None   Procedures    Provider Notes (Medical Decision Making):     Differential Diagnosis:  influenza, mononucleosis, acute bronchitis, URI, streptococcal pharyngitis, pneumonia, asthma exacerbation, allergic rhinitis, seasonal allergies, COVID    Plan: We will swab for Covid as requested. Have stressed importance of home isolation, hydration and rest.  Have discussed should she develop symptoms to symptomatically treat these. Return precautions have been given. Have advised primary care follow-up. Will discharge home. MED RECONCILIATION:  No current facility-administered medications for this encounter. Current Outpatient Medications   Medication Sig    methocarbamoL (Robaxin) 500 mg tablet Take 1 Tablet by mouth three (3) times daily.  lidocaine (Lidoderm) 5 % Apply patch to the affected area for 12 hours a day and remove for 12 hours a day.  rosuvastatin (CRESTOR) 40 mg tablet TAKE 1 TABLET BY MOUTH EVERY DAY    indapamide (LOZOL) 2.5 mg tablet TAKE 1 TABLET BY MOUTH EVERY DAY IN THE MORNING    eplerenone (INSPRA) 25 mg tablet Take 25 mg by mouth daily.  lisinopril-hydroCHLOROthiazide (PRINZIDE, ZESTORETIC) 20-12.5 mg per tablet Take 1 Tab by mouth daily. Disposition:  Home     DISCHARGE NOTE:   Pt has been reexamined. Patient has no new complaints, changes, or physical findings.   Care plan outlined and precautions discussed. Results of workup were reviewed with the patient. All medications were reviewed with the patient. All of pt's questions and concerns were addressed. Patient was instructed and agrees to follow up with PCP as well as to return to the ED upon further deterioration. Patient is ready to go home. Follow-up Information     Follow up With Specialties Details Why Contact Info    [facility] EMERGENCY DEPT Emergency Medicine  As needed [address] [place] [address] [address] [facility]    Cathi Guerrier NP Nurse Practitioner Schedule an appointment as soon as possible for a visit   [address] [facility]  [phone]            Current Discharge Medication List              Diagnosis     Clinical Impression:   1. Close exposure to COVID-19 virus          \"Please note that this dictation was completed with [employer], the computer voice recognition software. Quite often unanticipated grammatical, syntax, homophones, and other interpretive errors are inadvertently transcribed by the computer software. Please disregard these errors. Please excuse any errors that have escaped final proofreading. \"

## 2021-11-17 ENCOUNTER — TRANSCRIBE ORDER (OUTPATIENT)
Dept: SCHEDULING | Age: 56
End: 2021-11-17

## 2021-11-17 DIAGNOSIS — R07.9 CHEST PAIN, UNSPECIFIED: Primary | ICD-10-CM

## 2021-11-29 ENCOUNTER — HOSPITAL ENCOUNTER (OUTPATIENT)
Dept: NUCLEAR MEDICINE | Age: 56
Discharge: HOME | End: 2021-11-29
Payer: MEDICAID

## 2021-11-29 ENCOUNTER — HOSPITAL ENCOUNTER (OUTPATIENT)
Dept: NON INVASIVE DIAGNOSTICS | Age: 56
Discharge: HOME | End: 2021-11-29
Payer: MEDICAID

## 2021-11-29 VITALS
DIASTOLIC BLOOD PRESSURE: 68 MMHG | HEIGHT: 66 IN | BODY MASS INDEX: 27.8 KG/M2 | WEIGHT: 173 LBS | SYSTOLIC BLOOD PRESSURE: 114 MMHG

## 2021-11-29 VITALS
HEIGHT: 66 IN | WEIGHT: 173 LBS | BODY MASS INDEX: 27.8 KG/M2 | DIASTOLIC BLOOD PRESSURE: 68 MMHG | SYSTOLIC BLOOD PRESSURE: 114 MMHG

## 2021-11-29 DIAGNOSIS — R07.9 CHEST PAIN, UNSPECIFIED: ICD-10-CM

## 2021-11-29 DIAGNOSIS — R07.9 CHEST PAIN, UNSPECIFIED TYPE: ICD-10-CM

## 2021-11-29 LAB
STRESS BASELINE DIAS BP: 68 MMHG
STRESS BASELINE HR: 72 BPM
STRESS BASELINE SYS BP: 114 MMHG
STRESS ESTIMATED WORKLOAD: 1 METS
STRESS EXERCISE DUR MIN: NORMAL
STRESS PEAK DIAS BP: 80 MMHG
STRESS PEAK SYS BP: 136 MMHG
STRESS PERCENT HR ACHIEVED: 62 %
STRESS POST PEAK HR: 101 BPM
STRESS RATE PRESSURE PRODUCT: NORMAL BPM*MMHG
STRESS TARGET HR: 162 BPM

## 2021-11-29 PROCEDURE — 78452 HT MUSCLE IMAGE SPECT MULT: CPT | Performed by: INTERNAL MEDICINE

## 2021-11-29 PROCEDURE — 93018 CV STRESS TEST I&R ONLY: CPT | Performed by: INTERNAL MEDICINE

## 2021-11-29 PROCEDURE — 93017 CV STRESS TEST TRACING ONLY: CPT

## 2021-11-29 PROCEDURE — 74011250636 HC RX REV CODE- 250/636

## 2021-11-29 PROCEDURE — 93016 CV STRESS TEST SUPVJ ONLY: CPT | Performed by: INTERNAL MEDICINE

## 2021-11-29 RX ORDER — SODIUM CHLORIDE 9 MG/ML
250 INJECTION, SOLUTION INTRAVENOUS ONCE
Status: COMPLETED | OUTPATIENT
Start: 2021-11-29 | End: 2021-11-29

## 2021-11-29 RX ADMIN — REGADENOSON 0.4 MG: 0.08 INJECTION, SOLUTION INTRAVENOUS at 10:50

## 2021-11-29 RX ADMIN — SODIUM CHLORIDE 250 ML: 900 INJECTION, SOLUTION INTRAVENOUS at 10:50

## 2021-12-23 ENCOUNTER — TELEPHONE (OUTPATIENT)
Dept: PHYSICAL THERAPY | Age: 56
End: 2021-12-23

## 2022-07-18 ENCOUNTER — OFFICE VISIT (OUTPATIENT)
Dept: ORTHOPEDIC SURGERY | Age: 57
End: 2022-07-18
Payer: MEDICAID

## 2022-07-18 VITALS
HEART RATE: 81 BPM | BODY MASS INDEX: 31.82 KG/M2 | WEIGHT: 198 LBS | TEMPERATURE: 97.3 F | HEIGHT: 66 IN | OXYGEN SATURATION: 97 %

## 2022-07-18 DIAGNOSIS — M25.551 BILATERAL HIP PAIN: ICD-10-CM

## 2022-07-18 DIAGNOSIS — M54.50 LOW BACK PAIN RADIATING TO BOTH LEGS: ICD-10-CM

## 2022-07-18 DIAGNOSIS — M79.604 LOW BACK PAIN RADIATING TO BOTH LEGS: Primary | ICD-10-CM

## 2022-07-18 DIAGNOSIS — M79.605 LOW BACK PAIN RADIATING TO BOTH LEGS: ICD-10-CM

## 2022-07-18 DIAGNOSIS — M54.50 LOW BACK PAIN RADIATING TO BOTH LEGS: Primary | ICD-10-CM

## 2022-07-18 DIAGNOSIS — M25.552 BILATERAL HIP PAIN: ICD-10-CM

## 2022-07-18 DIAGNOSIS — M79.604 LOW BACK PAIN RADIATING TO BOTH LEGS: ICD-10-CM

## 2022-07-18 DIAGNOSIS — M79.605 LOW BACK PAIN RADIATING TO BOTH LEGS: Primary | ICD-10-CM

## 2022-07-18 PROCEDURE — 99204 OFFICE O/P NEW MOD 45 MIN: CPT | Performed by: PHYSICAL MEDICINE & REHABILITATION

## 2022-07-18 NOTE — PROGRESS NOTES
[address]  [address], [address]  [facility], [address]  Phone: [phone]  Fax: [phone]      Patient: Triston Abbasi                                                                              MRN: [id number]        YOB: 1965          AGE: 59 y.o. PCP: Jason Holt NP  Date:  07/18/22    Reason for Consultation: low back pain    HPI:  Triston Abbasi is a 59 y.o. male with relevant PMH of HTN, HLD who presents with low back, and bilateral groin pain. He was incarcerated for 44 years released 6/12/2020. Once he was released he reported b/l groin pain he is s/p a left hernia- left scrotal hernai surgery in 2019. He reeports low back pain radiating into bilateral legs and swelling in his legs. . He had a CT scan of his lumbar spine 8/2021 which demonstrated degenerative changes and mild canal stenosis. Neurologic symptoms: No numbness, tingling, weakness, bowel or bladder changes. No recent falls      Location: The pain is located in the bilateral groin, low back pain radiating down bilateral legs  Radiation: The pain does radiate bilateral legs. Pain Score: Currently: 10/10   Quality: Pain is of a Achy, Burning, Tight and Pulling quality. Aggravating: Pain is exacerbated by walking and standing  Alleviating: The pain is alleviated by lying down    Prior Treatments:  Physical therapy: NO  Injections:NO    Previous Medications:   Current Medications: methocarbamol  Previous work-up has included:   X-ray lumbar 12/4/2020  There are 5 lumbar-type vertebra. There is dextrocurvature of the lumbar spine. No fracture appreciated. The facets are appropriately aligned. Moderate disc space height loss is noted at every level with endplate sclerosis and osteophyte formation. This is mildly more conspicuous at L3-L4. X-ray right knee 12/4/2020  Minimal degenerative changes as above.    2. Quadriceps insertional enthesopathy. Past Medical History:   Past Medical History:   Diagnosis Date    Arthritis     Chronic obstructive pulmonary disease ([facility] [address]     Hypertension       Past Surgical History:   Past Surgical History:   Procedure Laterality Date    HX HEENT      left inguinal hernia repair    HX ORTHOPAEDIC      right arm      SocHx:   Social History     Tobacco Use    Smoking status: Former Smoker     Quit date:      Years since quittin.5    Smokeless tobacco: Never Used   Substance Use Topics    Alcohol use: Not Currently     Alcohol/week: 5.0 standard drinks     Types: 5 Cans of beer per week      FamHx:? History reviewed. No pertinent family history. Current Medications:    Current Outpatient Medications   Medication Sig Dispense Refill    rosuvastatin (CRESTOR) 40 mg tablet TAKE 1 TABLET BY MOUTH EVERY DAY      indapamide (LOZOL) 2.5 mg tablet TAKE 1 TABLET BY MOUTH EVERY DAY IN THE MORNING      eplerenone (INSPRA) 25 mg tablet Take 25 mg by mouth daily.  lisinopril-hydroCHLOROthiazide (PRINZIDE, ZESTORETIC) 20-12.5 mg per tablet Take 1 Tab by mouth daily. 30 Tab 0    methocarbamoL (Robaxin) 500 mg tablet Take 1 Tablet by mouth three (3) times daily. (Patient not taking: Reported on 2022) 15 Tablet 0    lidocaine (Lidoderm) 5 % Apply patch to the affected area for 12 hours a day and remove for 12 hours a day.  (Patient not taking: Reported on 2022) 15 Each 0      Allergies:  No Known Allergies     Review of Systems:   Gen:    Denied fevers, chills, malaise, fatigue, weight changes   Resp: Denied shortness of breath, cough, wheezing   CVS: Denied chest pain, palpitations   : Denied urinary urgency, frequency, incontinence   GI: Denied nausea, vomiting, constipation, diarrhea   Skin: Denied rashes, wounds   Psych: Denied anxiety, depression   Vasc: Denied claudication, ulcers   Hem: Denied easy bruising/bleeding   MSK: See HPI   Neuro: See HPI         Physical Exam     Vital Signs: Visit Vitals  Pulse 81   Temp 97.3 °F (36.3 °C) (Temporal)   Ht 5' 6\" (1.676 m)   Wt 198 lb (89.8 kg)   SpO2 97%   BMI 31.96 kg/m²      General: ??????? Well nourished and well developed male without any acute distress   Psychiatric: ?  Alert and oriented x 3 with normal mood    HEENT: ???????? Atraumatic   Respiratory:   Breathing non-labored and non dyspneic   CV: ???????????????? Peripheral pulses intact, no peripheral edema   Skin: ????????????? No rashes       Neurologic: ?? Sensation: normal and grossly intact thebilateral, lower extremity(s)   Strength: 5/5 in the bilateral, lower extremity(s)   Reflexes: reveals 2+ symmetric DTRs throughout  Gait: normal     Musculoskeletal: Lumbar Exam     Inspection:   Alignment: Normal  Atrophy: None     Tenderness to Palpation:   Lumbar paraspinals Positive  Lumbar spinous processes Negative  SI Joint:  Positive  Gluteal:Negative   Greater trochanter: Negative      ROM:   Lumbar ROM: Abnormal pain with flexion and extension worse with extension  Lumbar facet loading: Negative  Hip ROM: No reproduction of pain with movement     Special Tests      Slump test: Negative  SLR: Negative  AILEEN: Positive low back  FADIR: Positive b/l  Log Roll: Negative      Medical Decision Making:    Images: The imaging results as well as the actual images of the studies below were reviewed, visualized and interpreted by me. Labs: The results below were reviewed. CT lumbar spine 8/2021 images reviewed with patient, right convex curvature, degenerative changes mild canal stenosis  Reviewed prior hernia surgery notes     Assessment:   -low back pain radiating down bilateral legs     Plan:      -Physical therapy -  Referral to start PT  -DME- LSO- discussed only using as needed with prolonged stnading  -Medications -NA. Counseled regarding side effects and appropriate administration of medications.    -Diagnostics/Imaging - MRI lumbar spine, evaluate for spinal stenosis. X-ray bilateral hips   -InjectionsNA  -Lifestyle - Encouraged regular exercise  -Education - The patient's diagnosis, prognosis and treatment options were discussed today. All questions were answered.   -BP elevated today- he ran out of medication but will call PCP for refill  F/U - in 4 to review MRI and x-ray     Contact in [place] if need assistance is Johnnie [phone]    [address] [place] [address] and [facility]

## 2022-08-03 ENCOUNTER — HOSPITAL ENCOUNTER (OUTPATIENT)
Dept: PHYSICAL THERAPY | Age: 57
Discharge: HOME | End: 2022-08-03
Payer: MEDICAID

## 2022-08-03 PROCEDURE — 97162 PT EVAL MOD COMPLEX 30 MIN: CPT

## 2022-08-03 NOTE — PROGRESS NOTES
[facility] - [place] [address]  [address], [address] [phone] fax    Plan of Care/ Statement of Necessity for Physical Therapy Services           Patient name: Christen Osman Start of Care: 8/3/2022   Referral source: [place] Idalia* : 1965    Medical Diagnosis: Other low back pain [M54.59]  Pain in right leg [M79.604]  Pain in left leg [M79.605]  Payor: Jhoana Coe / Plan: [id number] / Product Type: Managed Care Medicaid /  Onset Date:chronic condition with acute exacerbations 2022    Treatment Diagnosis: low back pain, right leg pain, left leg pain   Prior Hospitalization: see medical history Provider#: [id number]   Medications: Verified on Patient summary List    Comorbidities: HTN, Asthma   Prior Level of Function: Able to tolerate prolonged walking, standing, and ADL's    The Plan of Care and following information is based on the information from the initial evaluation. Assessment/ key information:     Patient is a 59 y.o.male who presents to PT with Other low back pain [M54.59]  Pain in right leg [M79.604]  Pain in left leg [M79.605] with acute exacerbations on 2022, approximately. Pt reports he has recently been released from a 36 year intermediate sentence, and is now able to get appropriate care. Neurological screen revealed intact bilateral LE reflexes (L1 and S4), with decreased fine touch sensation on left lower leg globally when compared to right. Pt is a severe fall risk, with last reported fall on 2022. Pt advised to consider using an AD to prevent loss of balance. Pt currently exhibits decreased strength, ROM, decreased transfer abilities, and balance deficits. The patient will benefit from skilled PT services to address those deficits and facilitate return to premorbid activity level and improved quality of life.      Evaluation Complexity History MEDIUM  Complexity : 1-2 comorbidities / personal factors will impact the outcome/ POC ; Examination MEDIUM Complexity : 3 Standardized tests and measures addressing body structure, function, activity limitation and / or participation in recreation  ;Presentation MEDIUM Complexity : Evolving with changing characteristics  ; Clinical Decision Making MEDIUM Complexity : FOTO score of 26-74  Overall Complexity Rating: MEDIUM  Problem List: pain affecting function, decrease ROM, decrease strength, edema affecting function, impaired gait/ balance, decrease ADL/ functional abilitiies, decrease activity tolerance, decrease flexibility/ joint mobility, and decrease transfer abilities   Treatment Plan may include any combination of the following: Therapeutic exercise, Therapeutic activities, Neuromuscular re-education, Physical agent/modality, Gait/balance training, Manual therapy, Patient education, Self Care training, Functional mobility training, Home safety training, and Stair training  Patient / Family readiness to learn indicated by: asking questions, trying to perform skills, and interest  Persons(s) to be included in education: patient (P)  Barriers to Learning/Limitations: None  Patient Goal (s): Get healed  Patient Self Reported Health Status: poor  Rehabilitation Potential: good    Short Term Goals: To be accomplished in 2 treatments:  Pt will report compliance with HEP in order to tolerate prolonged/repetitive ADL's. Eval: Issued    Pt will report an average score of  <5/10 pain in order to tolerate prolonged walking. Eval: Reported 9/10     Long Term Goals: To be accomplished in 4 weeks:  Pt will report an average score of <1/10 pain in order to tolerate prolonged walking. Eval: Reported 9/10     Pt will score 5/5 on bilateral hip flexion MMT in order to tolerate repetitive stair negotiation. Eval: Scored: 2+/5 on hip flexion MMT    Pt will lift 25 lbs box off of ground x5 with appropriate body mechanics in order to tolerate prolonged/repetitive ADL's.   Eval: Did not attempt    Pt will score at least 47 on FOTO in order to improve overall function, decrease pain, an facilitate return to PLOF. Eval: 45    5. Pt will report no recent falls in order to decrease fall risk   Eval: last reported fall 7/12/2022       Frequency / Duration: Patient to be seen 2 times per week for 4 weeks. Patient/ Caregiver education and instruction: Diagnosis, prognosis, self care, activity modification, and exercises   [x]  Plan of care has been reviewed with AZAEL Mendez, PT 8/3/2022 2:16 PM    ________________________________________________________________________    I certify that the above Therapy Services are being furnished while the patient is under my care. I agree with the treatment plan and certify that this therapy is necessary.     Physician's Signature:____________Date:_________TIME:________     Parrish Fernandez*  ** Signature, Date and Time must be completed for valid certification **    Please sign and return to [facility] [address]  [address] [place], [address]  [phone] fax

## 2022-08-03 NOTE — PROGRESS NOTES
PT DAILY TREATMENT NOTE     Patient Name: Edilberto Vanegas  Date:8/3/2022  : 1965  [x]  Patient  Verified  Payor: Trude Holstein / Plan: [id number] [employer] / Product Type: Managed Care Medicaid /    In time: 2:17  Out time: 2:52  Total Treatment Time (min): 35  Visit #: 1 of 4    Treatment Area: Other low back pain [M54.59]  Pain in right leg [M79.604]  Pain in left leg [M79.605]    SUBJECTIVE  Pain Level (0-10 scale): 9/10  Any medication changes, allergies to medications, adverse drug reactions, diagnosis change, or new procedure performed?: [x] No    [] Yes (see summary sheet for update)  Subjective functional status/changes:   [] No changes reported  Pt states, \"I just got out of jail. I need time to process things\"    OBJECTIVE    35 min [x]Eval                  []Re-Eval             With   [] TE   [] TA   [] neuro   [] other: Patient Education: [x] Review HEP    [] Progressed/Changed HEP based on:   [x] positioning   [x] body mechanics   [] transfers   [] heat/ice application    [] other:      Other Objective/Functional Measures: see eval     Pain Level (0-10 scale) post treatment: 9/10    ASSESSMENT/Changes in Function:     Patient will continue to benefit from skilled PT services to modify and progress therapeutic interventions, address functional mobility deficits, address ROM deficits, address strength deficits, analyze and address soft tissue restrictions, analyze and cue movement patterns, analyze and modify body mechanics/ergonomics, assess and modify postural abnormalities, address imbalance/dizziness, and instruct in home and community integration to attain remaining goals. []  See Plan of Care  []  See progress note/recertification  []  See Discharge Summary         Progress towards goals / Updated goals:    Short Term Goals: To be accomplished in 2 treatments:    Pt will report compliance with HEP in order to tolerate prolonged/repetitive ADL's.   Eval: Issued    Pt will report an average score of  <5/10 pain in order to tolerate prolonged walking. Eval: Reported 9/10     Long Term Goals: To be accomplished in 4 weeks:    Pt will report an average score of <1/10 pain in order to tolerate prolonged walking. Eval: Reported 9/10     Pt will score 5/5 on bilateral hip flexion MMT in order to tolerate repetitive stair negotiation. Eval: Scored: 2+/5 on hip flexion MMT    Pt will lift 25 lbs box off of ground x5 with appropriate body mechanics in order to tolerate prolonged/repetitive ADL's. Eval: Did not attempt    Pt will score at least 47 on FOTO in order to improve overall function, decrease pain, an facilitate return to PLOF. Eval: 45    5.     Pt will report no recent falls in order to decrease fall risk   Eval: last reported fall 7/12/2022    PLAN  [x]  Upgrade activities as tolerated     [x]  Continue plan of care  []  Update interventions per flow sheet       []  Discharge due to:_  []  Other:_      Job Curl, PT 8/3/2022  2:17 PM    Future Appointments   Date Time Provider Ignacio Duran   8/15/2022  3:45 PM MD [facility] BS AMB   8/18/2022  2:00 PM Deja Cardozo MD [facility] BS AMB

## 2022-08-15 ENCOUNTER — APPOINTMENT (OUTPATIENT)
Dept: PHYSICAL THERAPY | Age: 57
End: 2022-08-15
Payer: MEDICAID

## 2022-08-15 ENCOUNTER — TELEPHONE (OUTPATIENT)
Dept: ORTHOPEDIC SURGERY | Age: 57
End: 2022-08-15

## 2022-08-16 ENCOUNTER — TELEPHONE (OUTPATIENT)
Dept: PHYSICAL THERAPY | Age: 57
End: 2022-08-16

## 2022-08-16 ENCOUNTER — HOSPITAL ENCOUNTER (OUTPATIENT)
Dept: PHYSICAL THERAPY | Age: 57
Discharge: HOME | End: 2022-08-16
Payer: MEDICAID

## 2022-08-16 PROCEDURE — 97110 THERAPEUTIC EXERCISES: CPT

## 2022-08-16 PROCEDURE — 97032 APPL MODALITY 1+ESTIM EA 15: CPT

## 2022-08-16 PROCEDURE — 97014 ELECTRIC STIMULATION THERAPY: CPT

## 2022-08-16 PROCEDURE — 97112 NEUROMUSCULAR REEDUCATION: CPT

## 2022-08-16 PROCEDURE — 97530 THERAPEUTIC ACTIVITIES: CPT

## 2022-08-16 NOTE — PROGRESS NOTES
PT DAILY TREATMENT NOTE     Patient Name: Reanna Tadeo  UXUU:  : 1965  [x]  Patient  Verified  Payor: Donavon Anderson / Plan: Simba Marquez / Product Type: Managed Care Medicaid /    In time: 4:32  Out time: 5:19  Total Treatment Time (min): 47  Visit #: 2 of 4    Treatment Area: Other low back pain [M54.59]  Pain in right leg [M79.604]  Pain in left leg [M79.605]    SUBJECTIVE  Pain Level (0-10 scale): 7/10  Any medication changes, allergies to medications, adverse drug reactions, diagnosis change, or new procedure performed?: [x] No    [] Yes (see summary sheet for update)  Subjective functional status/changes:   [] No changes reported  Pt states, \"My brothers have been helping with my exercises. \"    OBJECTIVE    Modality rationale: decrease pain, increase tissue extensibility, and increase muscle contraction/control to improve the patients ability to tolerate ADL's   Min Type Additional Details   8 [] Estim:  []Unatt       [x]IFC  []Premod                        []Other:  []w/ice   []w/heat  Position: prone with two pillows under stomach   Location: lumbar region     [] Estim: []Att    []TENS instruct  []NMES                    []Other:  []w/US   []w/ice   []w/heat  Position:  Location:    []  Traction: [] Cervical       []Lumbar                       [] Prone          []Supine                       []Intermittent   []Continuous Lbs:  [] before manual  [] after manual    []  Ultrasound: []Continuous   [] Pulsed                           []1MHz   []3MHz W/cm2:  Location:    []  Iontophoresis with dexamethasone         Location: [] Take home patch   [] In clinic    []  Ice     []  heat  []  Ice massage  []  Laser   []  Anodyne Position:  Location:    []  Laser with stim  []  Other:  Position:  Location:    []  Vasopneumatic Device    []  Right     []  Left  Pre-treatment girth:  Post-treatment girth:  Measured at (location):  Pressure:       [] lo [] med [] hi   Temperature: [] lo [] med [] hi   [] Skin assessment post-treatment:  []intact []redness- no adverse reaction    []redness - adverse reaction:     8 min Therapeutic Exercise:  [x] See flow sheet :   Rationale: increase ROM, increase strength, improve coordination, and increase proprioception to improve the patients ability to return to PLOF    21 min Therapeutic Activity:  [x]  See flow sheet : lacey, URIAH, luly grimes, pt education on POC, TRX squats,    Rationale: increase strength, improve coordination, and increase proprioception  to improve the patients ability to compete repetitive transfers      10 min Neuromuscular Re-education:  [x]  See flow sheet : band/ball, PPT, deadbugs, supine alt deadbug marches    Rationale: increase strength, improve coordination, improve balance, and increase proprioception  to improve the patients ability to tolerate prolonged postures             With   [x] TE   [x] TA   [] neuro   [] other: Patient Education: [x] Review HEP    [] Progressed/Changed HEP based on:   [x] positioning   [x] body mechanics   [] transfers   [] heat/ice application    [] other:      Other Objective/Functional Measures:      Pain Level (0-10 scale) post treatment: 7/10    ASSESSMENT/Changes in Function:     Pt demonstrated severe lumbo-pelvic motor deficits when attempting to complete PPT's. Pt required heavy verbal/tactile cuing in order to maximize motor control/recruitment. Patient will continue to benefit from skilled PT services to modify and progress therapeutic interventions, address functional mobility deficits, address ROM deficits, address strength deficits, analyze and address soft tissue restrictions, analyze and cue movement patterns, analyze and modify body mechanics/ergonomics, assess and modify postural abnormalities, address imbalance/dizziness, and instruct in home and community integration to attain remaining goals.      []  See Plan of Care  []  See progress note/recertification  []  See Discharge Summary         Progress towards goals / Updated goals:     Short Term Goals: To be accomplished in 2 treatments:  Pt will report compliance with HEP in order to tolerate prolonged/repetitive ADL's. Eval: Issued  Pt reports daily compliance   Pt will report an average score of  <5/10 pain in order to tolerate prolonged walking. Eval: Reported 9/10  Pt reports 8/10     Long Term Goals: To be accomplished in 4 weeks:  Pt will report an average score of <1/10 pain in order to tolerate prolonged walking. Eval: Reported 9/10  Pt reports 8/10  Pt will score 5/5 on bilateral hip flexion MMT in order to tolerate repetitive stair negotiation. Eval: Scored: 2+/5 on hip flexion MMT  Will reassess at 30 day asim   Pt will lift 25 lbs box off of ground x5 with appropriate body mechanics in order to tolerate prolonged/repetitive ADL's. Eval: Did not attempt  Have not attempted due to pain level    Pt will score at least 47 on FOTO in order to improve overall function, decrease pain, an facilitate return to PLOF. Eval: 45   Will reassess at 30 day asim    5. Pt will report no recent falls in order to decrease fall risk               Eval: last reported fall 7/12/2022   Pt reports falling on 8/9/2022 in the bathroom, due to balance deficits.  Pt reports not going to the ER    PLAN  [x]  Upgrade activities as tolerated     [x]  Continue plan of care  []  Update interventions per flow sheet       []  Discharge due to:_  []  Other:_      Rogelio Mendez, PT 8/16/2022  4:34 PM    Future Appointments   Date Time Provider Ignacio Duran   8/18/2022  2:00 PM Fran Tran MD [facility]   8/19/2022 12:00 PM [facility]   8/22/2022 12:00 PM Abdulkadir Johnson PT [facility]   8/25/2022 12:00 PM Sudarshan Ontiveros, PT [facility]   8/29/2022 12:00 PM Sudarshan Ontiveros, PT [facility]   8/31/2022 12:00 PM Sudarshan Ontiveros PT [facility]

## 2022-08-18 ENCOUNTER — OFFICE VISIT (OUTPATIENT)
Dept: NEUROLOGY | Age: 57
End: 2022-08-18
Payer: MEDICAID

## 2022-08-18 VITALS
WEIGHT: 189.4 LBS | OXYGEN SATURATION: 96 % | SYSTOLIC BLOOD PRESSURE: 100 MMHG | RESPIRATION RATE: 20 BRPM | HEIGHT: 66 IN | DIASTOLIC BLOOD PRESSURE: 80 MMHG | HEART RATE: 74 BPM | BODY MASS INDEX: 30.44 KG/M2

## 2022-08-18 DIAGNOSIS — G95.9 CERVICAL MYELOPATHY (HCC): Primary | ICD-10-CM

## 2022-08-18 DIAGNOSIS — R29.898 WEAKNESS OF BOTH LEGS: ICD-10-CM

## 2022-08-18 DIAGNOSIS — G89.29 CHRONIC MIDLINE LOW BACK PAIN, UNSPECIFIED WHETHER SCIATICA PRESENT: ICD-10-CM

## 2022-08-18 DIAGNOSIS — M54.50 CHRONIC MIDLINE LOW BACK PAIN, UNSPECIFIED WHETHER SCIATICA PRESENT: ICD-10-CM

## 2022-08-18 PROCEDURE — 99204 OFFICE O/P NEW MOD 45 MIN: CPT | Performed by: STUDENT IN AN ORGANIZED HEALTH CARE EDUCATION/TRAINING PROGRAM

## 2022-08-18 RX ORDER — GUAIFENESIN 100 MG/5ML
LIQUID (ML) ORAL
COMMUNITY
Start: 2022-08-05

## 2022-08-18 RX ORDER — ALBUTEROL SULFATE 90 UG/1
AEROSOL, METERED RESPIRATORY (INHALATION)
COMMUNITY
Start: 2022-06-14

## 2022-08-18 RX ORDER — DICLOFENAC SODIUM 10 MG/G
GEL TOPICAL
COMMUNITY
Start: 2022-06-14

## 2022-08-18 NOTE — LETTER
8/18/2022    Patient: Kaleb Rome   YOB: 1965   Date of Visit: 8/18/2022     Richa Galindo, [address] [facility] 12  [facility] [address] [id number]  Via Fax: 071 [facility], [address] [id number]  Via Fax: [phone]    Dear NIGEL Patel MD,      Thank you for referring Mr. Ron Davis to [facility] for evaluation. My notes for this consultation are attached. If you have questions, please do not hesitate to call me. I look forward to following your patient along with you.       Sincerely,    Greg Mendez MD

## 2022-08-18 NOTE — PROGRESS NOTES
Reanna Tadeo is a 59 y.o. male . presents for New Patient and Extremity Weakness (BLE)   . A 59years old male patient with medical history of hypertension, COPD, and arthritis was referred here for lower extremity weakness. Patient has been incarcerated for about 40 years; related in June 2020. While in custodial, he used to exercise heavily including lifting heavy weights. Has been having lower back pains radiating to the lower extremities. About 2 years ago, started to have lower extremity weakness which is progressively getting worse. He currently has a cane. He feels off balance when walking. He had about 3 falls. When his trying to stand up and walk his legs might feel shaky and numb. He cannot walk long distances. He also has tingling and burning sensation over the feet; feet feel numb. His left hand feels tight and numb; might involve his arm. The arm might also feel tingly. He has lower neck pain; radiates to the left upper extremity. He denied having any bladder or bowel dysfunction. He has gabapentin; he claims is not working. [facility]. Attending physical therapy. CT scan of the lumbar spine from August 2021 has shown scoliosis, degenerative changes with  mild narrowing of the spinal canal in AP dimension in mid and the lower lumbar spine, likely congenital. Moderate foraminal narrowing at mid and lower lumbar spine at level of L3-4 and L4-5 are present. He has an order for MRI of the lumbosacral spine; has yet to schedule it. Review of Systems   Constitutional:  Positive for chills. Negative for fever and weight loss. HENT:  Negative for hearing loss and tinnitus. Eyes:  Positive for blurred vision (righjt side). Negative for double vision. Respiratory:  Positive for cough (sometimes), sputum production and shortness of breath. Negative for hemoptysis.     Cardiovascular:  Positive for chest pain (right side) and leg swelling. Gastrointestinal:  Negative for nausea and vomiting. Genitourinary:  Positive for frequency. Negative for dysuria and urgency. Musculoskeletal:  Positive for back pain, joint pain (right knee) and neck pain. Skin:  Negative for itching and rash. Neurological:  Positive for dizziness, tingling, tremors (left hand), sensory change, focal weakness (lower ext) and headaches (sometimes). Negative for seizures. Psychiatric/Behavioral:  Negative for depression. The patient is not nervous/anxious. Past Medical History:   Diagnosis Date    Arthritis     Chronic obstructive pulmonary disease ([facility] [address]     Hypertension        Past Surgical History:   Procedure Laterality Date    HX HEENT      left inguinal hernia repair    HX ORTHOPAEDIC      right arm        No family history on file.      Social History     Socioeconomic History    Marital status: SINGLE     Spouse name: Not on file    Number of children: Not on file    Years of education: Not on file    Highest education level: Not on file   Occupational History    Not on file   Tobacco Use    Smoking status: Former     Types: Cigarettes     Quit date: 0     Years since quittin.6    Smokeless tobacco: Never   Substance and Sexual Activity    Alcohol use: Not Currently     Alcohol/week: 5.0 standard drinks     Types: 5 Cans of beer per week    Drug use: No    Sexual activity: Yes     Partners: Female   Other Topics Concern    Not on file   Social History Narrative    Not on file     Social Determinants of Health     Financial Resource Strain: Not on file   Food Insecurity: Not on file   Transportation Needs: Not on file   Physical Activity: Not on file   Stress: Not on file   Social Connections: Not on file   Intimate Partner Violence: Not on file   Housing Stability: Not on file        No Known Allergies      Current Outpatient Medications   Medication Sig Dispense Refill    methocarbamoL (Robaxin) 500 mg tablet Take 1 Tablet by mouth three (3) times daily. 15 Tablet 0    rosuvastatin (CRESTOR) 40 mg tablet TAKE 1 TABLET BY MOUTH EVERY DAY      indapamide (LOZOL) 2.5 mg tablet TAKE 1 TABLET BY MOUTH EVERY DAY IN THE MORNING      eplerenone (INSPRA) 25 mg tablet Take 25 mg by mouth daily. lisinopril-hydroCHLOROthiazide (PRINZIDE, ZESTORETIC) 20-12.5 mg per tablet Take 1 Tab by mouth daily. 30 Tab 0    albuterol (PROVENTIL HFA, VENTOLIN HFA, PROAIR HFA) 90 mcg/actuation inhaler INHALE 1 PUFF BY MOUTH EVERY 4 HOURS AS NEEDED      aspirin 81 mg chewable tablet CHEW AND SWALLOW 1 TABLET BY MOUTH ONCE DAILY      diclofenac (VOLTAREN) 1 % gel APPLY 2 GRAMS EXTERNALLY TWICE A DAY      lidocaine (Lidoderm) 5 % Apply patch to the affected area for 12 hours a day and remove for 12 hours a day. (Patient not taking: No sig reported) 15 Each 0         Physical Exam  Constitutional:       Appearance: Normal appearance. HENT:      Head: Normocephalic and atraumatic. Mouth/Throat:      Mouth: Mucous membranes are moist.      Pharynx: Oropharynx is clear. No oropharyngeal exudate. Eyes:      Extraocular Movements: Extraocular movements intact. Pupils: Pupils are equal, round, and reactive to light. Neck:      Comments: Supplementation of neck movement to the left side with mild tenderness. Pulmonary:      Effort: Pulmonary effort is normal. No respiratory distress. Musculoskeletal:      Right lower leg: No edema. Left lower leg: No edema. Neurological:      Mental Status: He is alert. Comments: Mental status: Awake, alert, oriented, follows simple and complex commands, no neglect, no extinction. Speech and languge: fluent, coherent, n and comprehension intact  CN: VFF, EOMI, PERRLA, face sensation intact , no facial asymmetry noted, palate elevation symmetric bilat, SS+SCM 5/5 bilat, tongue midline  Motor: no pronator drift, tone normal throughout, strength 5/5 throughout except the LUE 4+/5; and the hip flexors 4+/5.    Sensory: Decreased LT and PP over the LUE. Coordination: FNF, HS accurate w/o dysmetria  DTR: 3+ over the BR, biceps, and knees bilaterally; 2+ at ankles and triceps; toes downgoing BL. Positive Ha on the right. Gait: Antalgic. No visits with results within 3 Month(s) from this visit. Latest known visit with results is:   Hospital Outpatient Visit on 11/29/2021   Component Date Value Ref Range Status    Stress Target HR 11/29/2021 162  bpm Final    Exercise Duration Time 11/29/2021 00:04:00   Final    Stress Systolic BP 02/51/2546 053  mmHg Final    Stress Diastolic BP 95/79/8657 80  mmHg Final    Stress Peak HR 11/29/2021 101  BPM Final    Baseline HR 11/29/2021 72  BPM Final    Stress Estimated Workload 11/29/2021 1.0  METS Final    Stress Percent HR Achieved 11/29/2021 62  % Final    Stress Rate Pressure Product 11/29/2021 13,736  BPM*mmHg Final    Baseline Systolic BP 63/46/2055 294  mmHg Final    Baseline Diastolic BP 70/66/3838 68  mmHg Final       Study Result    Narrative & Impression   CT of the lumbar spine without contrast     HISTORY: Back pain     COMPARISON: Abdomen pelvis CT 6/30/20. TECHNIQUE: Helical axial images to the lumbar spine are obtained without  intrathecal contrast.  Sagittal and coronal reconstructions were obtained to  better evaluate alignment, disc space heights, interfacet relations and the  vertebral integrity. All CT scans at this facility performed using dose optimization techniques as  appreciated to a performed exam, to include automated exposure control,  adjustment of the mA and or KU according to patient size (including appropriate  matching for site specific examination), or use of iterative reconstruction  technique. FINDINGS:      There is mild scoliotic changes with associated moderate spondylosis, unchanged  since the prior CT in 6/20. The vertebral column and alignment of the lumbar  spine are otherwise unremarkable.   No evidence of compression fracture or  subluxation identified. The Schmorl's nodes along the superior T12, L1, L2 and  L3 appear stable. Mild to moderate facet arthropathy at mid and lower lumbar  spine with ligamentous hypertrophy present. There is mild narrowing of the  spinal canal in AP dimension in mid and the lower lumbar spine, likely  congenital. Moderate foraminal narrowing at mid and lower lumbar spine at level  of L3-4 and L4-5 are present. Imaged paraspinal region appear unremarkable. IMPRESSION     1. No acute compression fracture or subluxation. 2. Scoliosis and chronic spondylosis appear stable. Mild to moderate Can. And  foraminal stenosis are present. If radiculopathy present, L-spine MR is advised  for better evaluation. ICD-10-CM ICD-9-CM    1. Cervical myelopathy (HCC)  G95.9 721.1 MRI CERV SPINE WO CONT      2. Chronic midline low back pain, unspecified whether sciatica present  M54.50 724.2     G89.29 338.29       3. Weakness of both legs  R29.898 729.89         A 59years old male patient here for evaluation of chronic lower back pain, lower extremity weakness, balance difficulty. Has possible spinal claudication over his lower extremities. But has significant weakness with balance difficulty when trying to ambulate. On exam, his deep tendon flexes are exaggerated over the upper extremities and knees; positive Kye on the right side. He has neck pain radiating to the left upper extremity; mild left upper extremity sensory symptoms and weakness. Finding this might suggest possible cervical myelopathy. He has an order for MRI of the lumbosacral spine and I have told him to schedule it. In addition, will get MRI of the cervical spine. We will continue the gabapentin for now. He also has physical therapy. He will continue follow-up at the spine center. We will call him with the results of the MRIs. We will see him again in 2 months time.

## 2022-08-19 ENCOUNTER — TELEPHONE (OUTPATIENT)
Dept: PHYSICAL THERAPY | Age: 57
End: 2022-08-19

## 2022-08-23 NOTE — PROGRESS NOTES
[facility] - [place] [address]  [address], [address] [phone] fax    Discharge Summary  Patient name: Leobardo Carlin Start of Care: 8/3/2022   Referral source: Ty Ceron* : 1965                Medical Diagnosis: Other low back pain [M54.59]  Pain in right leg [M79.604]  Pain in left leg [M79.605]  Payor: [employer] / Plan: [employer] Type: Managed Care Medicaid /  Onset Date:chronic condition with acute exacerbations 2022                Treatment Diagnosis: low back pain, right leg pain, left leg pain   Prior Hospitalization: see medical history Provider#: [id number]   Medications: Verified on Patient summary List    Comorbidities: HTN, Asthma   Prior Level of Function: Able to tolerate prolonged walking, standing, and ADL's  Visits from Start of Care: 2    Missed Visits: 3  Reporting Period : 8/3/2022 to 2022    Goal: Pt will report compliance with HEP in order to tolerate prolonged/repetitive ADL's. Status at last note/certification: unable to reassess  Status at discharge: not met    Goal: Pt will report an average score of  <5/10 pain in order to tolerate prolonged walking. Status at last note/certification: unable to reassess  Status at discharge: not met    Goal:Pt will report an average score of <1/10 pain in order to tolerate prolonged walking. Status at last note/certification: unable to reassess  Status at discharge: not met    Goal:Pt will score 5/5 on bilateral hip flexion MMT in order to tolerate repetitive stair negotiation. Status at last note/certification:unable to reassess  Status at discharge: not met    Goal: Pt will lift 25 lbs box off of ground x5 with appropriate body mechanics in order to tolerate prolonged/repetitive ADL's.   Status at last note/certification: unable to reassess  Status at discharge: not met    Goal: Pt will score at least 47 on FOTO in order to improve overall function, decrease pain, an facilitate return to PLOF. Status at last note/certification: unable to reassess  Status at discharge: not met    Goal: Pt will report no recent falls in order to decrease fall risk  Status at last note/certification: unable to reassess  Status at discharge: not met    Assessment/Summary of care:   Pt was seen for 2 therapy sessions. The pt's last therapy appointment was on 8/16/2022. Per pt's chart, the pt NS/cancelled three therapy appointments. Pt is d/c'ed from therapy secondary to non-compliance/cancellation policy and unable to reassess goals at this time. RECOMMENDATIONS:  [x]Discontinue therapy: []Patient has reached or is progressing toward set goals      [x]Patient is non-compliant or has abdicated      []Due to lack of appreciable progress towards set goals    Trena Asencio, PT 8/23/2022 11:05 AM    NOTE TO PHYSICIAN:  Please complete the following and fax to: [facility] at [facility] at [phone]  . Retain this original for your records. If you are unable to process this request in   24 hours, please contact our office.      [] I have read the above report and request that my patient continue therapy with the following changes/special instructions:  [] I have read the above report and request that my patient be discharged from therapy    Physician's Signature:____________Date:_________TIME:________     Parrish Granados*  ** Signature, Date and Time must be completed for valid certification **

## 2022-08-25 ENCOUNTER — APPOINTMENT (OUTPATIENT)
Dept: PHYSICAL THERAPY | Age: 57
End: 2022-08-25
Payer: MEDICAID

## 2022-08-25 DIAGNOSIS — G95.9 CERVICAL MYELOPATHY (HCC): ICD-10-CM

## 2022-08-29 ENCOUNTER — APPOINTMENT (OUTPATIENT)
Dept: PHYSICAL THERAPY | Age: 57
End: 2022-08-29
Payer: MEDICAID

## 2022-08-31 ENCOUNTER — APPOINTMENT (OUTPATIENT)
Dept: PHYSICAL THERAPY | Age: 57
End: 2022-08-31
Payer: MEDICAID

## 2022-12-05 ENCOUNTER — APPOINTMENT (OUTPATIENT)
Dept: GENERAL RADIOLOGY | Age: 57
End: 2022-12-05
Attending: EMERGENCY MEDICINE
Payer: MEDICAID

## 2022-12-05 ENCOUNTER — APPOINTMENT (OUTPATIENT)
Dept: VASCULAR SURGERY | Age: 57
End: 2022-12-05
Attending: PHYSICIAN ASSISTANT
Payer: MEDICAID

## 2022-12-05 ENCOUNTER — APPOINTMENT (OUTPATIENT)
Dept: CT IMAGING | Age: 57
End: 2022-12-05
Attending: PHYSICIAN ASSISTANT
Payer: MEDICAID

## 2022-12-05 ENCOUNTER — HOSPITAL ENCOUNTER (EMERGENCY)
Age: 57
Discharge: HOME | End: 2022-12-05
Attending: EMERGENCY MEDICINE
Payer: MEDICAID

## 2022-12-05 VITALS
DIASTOLIC BLOOD PRESSURE: 100 MMHG | HEART RATE: 72 BPM | SYSTOLIC BLOOD PRESSURE: 170 MMHG | OXYGEN SATURATION: 95 % | RESPIRATION RATE: 18 BRPM | TEMPERATURE: 98.3 F

## 2022-12-05 DIAGNOSIS — I10 PRIMARY HYPERTENSION: ICD-10-CM

## 2022-12-05 DIAGNOSIS — R91.8 LUNG NODULES: ICD-10-CM

## 2022-12-05 DIAGNOSIS — R10.84 ABDOMINAL PAIN, GENERALIZED: Primary | ICD-10-CM

## 2022-12-05 DIAGNOSIS — M79.89 LEG SWELLING: ICD-10-CM

## 2022-12-05 LAB
ALBUMIN SERPL-MCNC: 3.9 G/DL (ref 3.4–5)
ALBUMIN/GLOB SERPL: 1 {RATIO} (ref 0.8–1.7)
ALP SERPL-CCNC: 130 U/L (ref 45–117)
ALT SERPL-CCNC: 34 U/L (ref 16–61)
ANION GAP SERPL CALC-SCNC: 1 MMOL/L (ref 3–18)
APPEARANCE UR: CLEAR
AST SERPL-CCNC: 26 U/L (ref 10–38)
BASOPHILS # BLD: 0 K/UL (ref 0–0.1)
BASOPHILS NFR BLD: 1 % (ref 0–2)
BILIRUB SERPL-MCNC: 0.4 MG/DL (ref 0.2–1)
BILIRUB UR QL: NEGATIVE
BNP SERPL-MCNC: 55 PG/ML (ref 0–900)
BUN SERPL-MCNC: 12 MG/DL (ref 7–18)
BUN/CREAT SERPL: 11 (ref 12–20)
CALCIUM SERPL-MCNC: 9.5 MG/DL (ref 8.5–10.1)
CHLORIDE SERPL-SCNC: 111 MMOL/L (ref 100–111)
CO2 SERPL-SCNC: 30 MMOL/L (ref 21–32)
COLOR UR: YELLOW
CREAT SERPL-MCNC: 1.07 MG/DL (ref 0.6–1.3)
DIFFERENTIAL METHOD BLD: ABNORMAL
EOSINOPHIL # BLD: 0.4 K/UL (ref 0–0.4)
EOSINOPHIL NFR BLD: 6 % (ref 0–5)
ERYTHROCYTE [DISTWIDTH] IN BLOOD BY AUTOMATED COUNT: 15.8 % (ref 11.6–14.5)
GLOBULIN SER CALC-MCNC: 3.9 G/DL (ref 2–4)
GLUCOSE SERPL-MCNC: 86 MG/DL (ref 74–99)
GLUCOSE UR STRIP.AUTO-MCNC: NEGATIVE MG/DL
HCT VFR BLD AUTO: 39.6 % (ref 36–48)
HGB BLD-MCNC: 12.4 G/DL (ref 13–16)
HGB UR QL STRIP: NEGATIVE
IMM GRANULOCYTES # BLD AUTO: 0 K/UL (ref 0–0.04)
IMM GRANULOCYTES NFR BLD AUTO: 0 % (ref 0–0.5)
KETONES UR QL STRIP.AUTO: NEGATIVE MG/DL
LEUKOCYTE ESTERASE UR QL STRIP.AUTO: NEGATIVE
LYMPHOCYTES # BLD: 2 K/UL (ref 0.9–3.6)
LYMPHOCYTES NFR BLD: 31 % (ref 21–52)
MAGNESIUM SERPL-MCNC: 2.1 MG/DL (ref 1.6–2.6)
MCH RBC QN AUTO: 23.5 PG (ref 24–34)
MCHC RBC AUTO-ENTMCNC: 31.3 G/DL (ref 31–37)
MCV RBC AUTO: 75.1 FL (ref 78–100)
MONOCYTES # BLD: 0.7 K/UL (ref 0.05–1.2)
MONOCYTES NFR BLD: 10 % (ref 3–10)
NEUTS SEG # BLD: 3.4 K/UL (ref 1.8–8)
NEUTS SEG NFR BLD: 53 % (ref 40–73)
NITRITE UR QL STRIP.AUTO: NEGATIVE
NRBC # BLD: 0 K/UL (ref 0–0.01)
NRBC BLD-RTO: 0 PER 100 WBC
PH UR STRIP: 7.5 [PH] (ref 5–8)
PLATELET # BLD AUTO: 262 K/UL (ref 135–420)
PMV BLD AUTO: 9.3 FL (ref 9.2–11.8)
POTASSIUM SERPL-SCNC: 3.9 MMOL/L (ref 3.5–5.5)
PROT SERPL-MCNC: 7.8 G/DL (ref 6.4–8.2)
PROT UR STRIP-MCNC: NEGATIVE MG/DL
RBC # BLD AUTO: 5.27 M/UL (ref 4.35–5.65)
SODIUM SERPL-SCNC: 142 MMOL/L (ref 136–145)
SP GR UR REFRACTOMETRY: 1.02 (ref 1–1.03)
TROPONIN-HIGH SENSITIVITY: 10 NG/L (ref 0–78)
UROBILINOGEN UR QL STRIP.AUTO: 0.2 EU/DL (ref 0.2–1)
WBC # BLD AUTO: 6.5 K/UL (ref 4.6–13.2)

## 2022-12-05 PROCEDURE — 74177 CT ABD & PELVIS W/CONTRAST: CPT

## 2022-12-05 PROCEDURE — 81003 URINALYSIS AUTO W/O SCOPE: CPT

## 2022-12-05 PROCEDURE — 93970 EXTREMITY STUDY: CPT

## 2022-12-05 PROCEDURE — 74011250637 HC RX REV CODE- 250/637: Performed by: PHYSICIAN ASSISTANT

## 2022-12-05 PROCEDURE — 93005 ELECTROCARDIOGRAM TRACING: CPT

## 2022-12-05 PROCEDURE — 71046 X-RAY EXAM CHEST 2 VIEWS: CPT

## 2022-12-05 PROCEDURE — 74011000636 HC RX REV CODE- 636: Performed by: EMERGENCY MEDICINE

## 2022-12-05 PROCEDURE — 80053 COMPREHEN METABOLIC PANEL: CPT

## 2022-12-05 PROCEDURE — 84484 ASSAY OF TROPONIN QUANT: CPT

## 2022-12-05 PROCEDURE — 83880 ASSAY OF NATRIURETIC PEPTIDE: CPT

## 2022-12-05 PROCEDURE — 99285 EMERGENCY DEPT VISIT HI MDM: CPT

## 2022-12-05 PROCEDURE — 83735 ASSAY OF MAGNESIUM: CPT

## 2022-12-05 PROCEDURE — 85025 COMPLETE CBC W/AUTO DIFF WBC: CPT

## 2022-12-05 RX ORDER — LISINOPRIL AND HYDROCHLOROTHIAZIDE 12.5; 2 MG/1; MG/1
1 TABLET ORAL DAILY
Qty: 30 TABLET | Refills: 2 | Status: SHIPPED | OUTPATIENT
Start: 2022-12-05

## 2022-12-05 RX ORDER — LISINOPRIL 20 MG/1
20 TABLET ORAL
Status: COMPLETED | OUTPATIENT
Start: 2022-12-05 | End: 2022-12-05

## 2022-12-05 RX ORDER — HYDROCHLOROTHIAZIDE 25 MG/1
12.5 TABLET ORAL
Status: COMPLETED | OUTPATIENT
Start: 2022-12-05 | End: 2022-12-05

## 2022-12-05 RX ORDER — ROSUVASTATIN CALCIUM 40 MG/1
40 TABLET, COATED ORAL DAILY
Qty: 30 TABLET | Refills: 2 | Status: SHIPPED | OUTPATIENT
Start: 2022-12-05

## 2022-12-05 RX ADMIN — LISINOPRIL 20 MG: 20 TABLET ORAL at 19:11

## 2022-12-05 RX ADMIN — IOPAMIDOL 100 ML: 612 INJECTION, SOLUTION INTRAVENOUS at 19:57

## 2022-12-05 RX ADMIN — HYDROCHLOROTHIAZIDE 12.5 MG: 25 TABLET ORAL at 19:11

## 2022-12-05 NOTE — ED TRIAGE NOTES
Pt sts bilateral LE swelling, and LUE 'locking up. \" He also reports that he feels he has gained a lot of weight in a short period

## 2022-12-05 NOTE — Clinical Note
[facility] EMERGENCY DEPT  [address] [address] [phone]    Work/School Note    Date: 12/5/2022    To Whom It May concern:    Mandy Kelly was seen and treated today in the emergency room by the following provider(s):  Attending Provider: Palmer Patricia MD  Physician Assistant: Jelena Freeman. Mandy Kelly is excused from work/school on 12/05/22 and 12/06/22. He is medically clear to return to work/school on 12/7/2022.        Sincerely,          JOSE Yap

## 2022-12-05 NOTE — Clinical Note
[address] [place] [address] [address] [facility] EMERGENCY DEPT  [id number] [address] [phone]    Work/School Note    Date: 12/5/2022    To Whom It May concern:    Gil Kong was seen and treated today in the emergency room by the following provider(s):  Attending Provider: Miladys Beal MD  Physician Assistant: Jelena Gates. Gil Kong is excused from work/school on 12/05/22 and 12/06/22. He is medically clear to return to work/school on 12/7/2022.        Sincerely,          JOSE Anderson

## 2022-12-06 LAB
ATRIAL RATE: 72 BPM
CALCULATED P AXIS, ECG09: 77 DEGREES
CALCULATED R AXIS, ECG10: 1 DEGREES
CALCULATED T AXIS, ECG11: 59 DEGREES
DIAGNOSIS, 93000: NORMAL
P-R INTERVAL, ECG05: 184 MS
Q-T INTERVAL, ECG07: 396 MS
QRS DURATION, ECG06: 110 MS
QTC CALCULATION (BEZET), ECG08: 433 MS
VENTRICULAR RATE, ECG03: 72 BPM

## 2022-12-06 NOTE — ED PROVIDER NOTES
EMERGENCY DEPARTMENT HISTORY AND PHYSICAL EXAM    7:33 PM      Date: 12/5/2022  Patient Name: Elo Coreas    History of Presenting Illness     Chief Complaint   Patient presents with    Bloated    Arm Pain         History Provided By: Patient    Additional History (Context): Elo Coreas is a 59 y.o. male with  hx of COPD, HTN, and other noted PMH  who presents with multiple medical complaints. Patient notes lower abdominal pain and distention x months. Patient denies fever or chills, chest pain, shortness of breath, nausea or vomiting, diarrhea, constipation. Notes umbilical hernia. Denies exacerbating or alleviating factors. Notes bilateral leg swelling x months. Denies history of DVT or PE, hemoptysis, recent surgery or travel, hemoptysis. Denies chest pain, dyspnea, orthopnea. Denies history of CHF. Denies blood thinner use. Notes he has been out of his blood pressure medication x weeks, requesting refill today. PCP: Dixon Merchant NP    Current Outpatient Medications   Medication Sig Dispense Refill    lisinopril-hydroCHLOROthiazide (PRINZIDE, ZESTORETIC) 20-12.5 mg per tablet Take 1 Tablet by mouth daily. 30 Tablet 2    rosuvastatin (CRESTOR) 40 mg tablet Take 1 Tablet by mouth daily. 30 Tablet 2    albuterol (PROVENTIL HFA, VENTOLIN HFA, PROAIR HFA) 90 mcg/actuation inhaler INHALE 1 PUFF BY MOUTH EVERY 4 HOURS AS NEEDED      aspirin 81 mg chewable tablet CHEW AND SWALLOW 1 TABLET BY MOUTH ONCE DAILY      indapamide (LOZOL) 2.5 mg tablet TAKE 1 TABLET BY MOUTH EVERY DAY IN THE MORNING      eplerenone (INSPRA) 25 mg tablet Take 25 mg by mouth daily.          Past History     Past Medical History:  Past Medical History:   Diagnosis Date    Arthritis     Chronic obstructive pulmonary disease ([address]     Hypertension        Past Surgical History:  Past Surgical History:   Procedure Laterality Date    HX HEENT      left inguinal hernia repair    HX ORTHOPAEDIC      right arm       Family History:  No family history on file. Social History:  Social History     Tobacco Use    Smoking status: Former     Types: Cigarettes     Quit date:      Years since quittin.9    Smokeless tobacco: Never   Substance Use Topics    Alcohol use: Not Currently     Alcohol/week: 5.0 standard drinks     Types: 5 Cans of beer per week    Drug use: No       Allergies:  No Known Allergies      Review of Systems       Review of Systems   Constitutional:  Negative for chills and fever. Respiratory:  Negative for shortness of breath. Cardiovascular:  Positive for leg swelling. Negative for chest pain. Gastrointestinal:  Positive for abdominal distention and abdominal pain. Negative for nausea and vomiting. Skin:  Negative for rash. Neurological:  Negative for weakness. All other systems reviewed and are negative. Physical Exam   Visit Vitals  BP (!) 170/100   Pulse 72   Temp 98.3 °F (36.8 °C)   Resp 18   SpO2 95%         Physical Exam  Vitals and nursing note reviewed. Constitutional:       General: He is not in acute distress. Appearance: He is well-developed. He is not diaphoretic. HENT:      Head: Normocephalic and atraumatic. Cardiovascular:      Rate and Rhythm: Normal rate and regular rhythm. Heart sounds: Normal heart sounds. No murmur heard. No friction rub. No gallop. Pulmonary:      Effort: Pulmonary effort is normal. No respiratory distress. Breath sounds: Normal breath sounds. No wheezing or rales. Abdominal:      General: Abdomen is flat. There is no distension. Palpations: Abdomen is soft. There is no mass. Tenderness: There is no abdominal tenderness. There is no right CVA tenderness, left CVA tenderness, guarding or rebound. Hernia: A hernia (umbilical, easily reducible) is present. Musculoskeletal:         General: Normal range of motion. Cervical back: Normal range of motion and neck supple. Right lower leg: Edema (1+) present. Left lower leg: Edema (1+) present. Comments: No erythema/edema/discoloration, dorsalis pedis 2+    Skin:     General: Skin is warm. Findings: No rash. Neurological:      Mental Status: He is alert. Diagnostic Study Results     Labs -  Recent Results (from the past 12 hour(s))   NT-PRO BNP    Collection Time: 12/05/22  3:14 PM   Result Value Ref Range    NT pro-BNP 55 0 - 072 PG/ML   METABOLIC PANEL, COMPREHENSIVE    Collection Time: 12/05/22  3:14 PM   Result Value Ref Range    Sodium 142 136 - 145 mmol/L    Potassium 3.9 3.5 - 5.5 mmol/L    Chloride 111 100 - 111 mmol/L    CO2 30 21 - 32 mmol/L    Anion gap 1 (L) 3.0 - 18 mmol/L    Glucose 86 74 - 99 mg/dL    BUN 12 7.0 - 18 MG/DL    Creatinine 1.07 0.6 - 1.3 MG/DL    BUN/Creatinine ratio 11 (L) 12 - 20      eGFR >60 >60 ml/min/1.73m2    Calcium 9.5 8.5 - 10.1 MG/DL    Bilirubin, total 0.4 0.2 - 1.0 MG/DL    ALT (SGPT) 34 16 - 61 U/L    AST (SGOT) 26 10 - 38 U/L    Alk. phosphatase 130 (H) 45 - 117 U/L    Protein, total 7.8 6.4 - 8.2 g/dL    Albumin 3.9 3.4 - 5.0 g/dL    Globulin 3.9 2.0 - 4.0 g/dL    A-G Ratio 1.0 0.8 - 1.7     CBC WITH AUTOMATED DIFF    Collection Time: 12/05/22  3:14 PM   Result Value Ref Range    WBC 6.5 4.6 - 13.2 K/uL    RBC 5.27 4.35 - 5.65 M/uL    HGB 12.4 (L) 13.0 - 16.0 g/dL    HCT 39.6 36.0 - 48.0 %    MCV 75.1 (L) 78.0 - 100.0 FL    MCH 23.5 (L) 24.0 - 34.0 PG    MCHC 31.3 31.0 - 37.0 g/dL    RDW 15.8 (H) 11.6 - 14.5 %    PLATELET 843 212 - 806 K/uL    MPV 9.3 9.2 - 11.8 FL    NRBC 0.0 0  WBC    ABSOLUTE NRBC 0.00 0.00 - 0.01 K/uL    NEUTROPHILS 53 40 - 73 %    LYMPHOCYTES 31 21 - 52 %    MONOCYTES 10 3 - 10 %    EOSINOPHILS 6 (H) 0 - 5 %    BASOPHILS 1 0 - 2 %    IMMATURE GRANULOCYTES 0 0.0 - 0.5 %    ABS. NEUTROPHILS 3.4 1.8 - 8.0 K/UL    ABS. LYMPHOCYTES 2.0 0.9 - 3.6 K/UL    ABS. MONOCYTES 0.7 0.05 - 1.2 K/UL    ABS. EOSINOPHILS 0.4 0.0 - 0.4 K/UL    ABS. BASOPHILS 0.0 0.0 - 0.1 K/UL    ABS. IMM. GRANS. 0.0 0.00 - 0.04 K/UL    DF AUTOMATED     MAGNESIUM    Collection Time: 12/05/22  3:14 PM   Result Value Ref Range    Magnesium 2.1 1.6 - 2.6 mg/dL   TROPONIN-HIGH SENSITIVITY    Collection Time: 12/05/22  3:14 PM   Result Value Ref Range    Troponin-High Sensitivity 10 0 - 78 ng/L   EKG, 12 LEAD, INITIAL    Collection Time: 12/05/22  7:06 PM   Result Value Ref Range    Ventricular Rate 72 BPM    Atrial Rate 72 BPM    P-R Interval 184 ms    QRS Duration 110 ms    Q-T Interval 396 ms    QTC Calculation (Bezet) 433 ms    Calculated P Axis 77 degrees    Calculated R Axis 1 degrees    Calculated T Axis 59 degrees    Diagnosis       Normal sinus rhythm  Incomplete right bundle branch block  Borderline ECG  When compared with ECG of 29-NOV-2021 10:36,  No significant change was found     URINALYSIS W/ RFLX MICROSCOPIC    Collection Time: 12/05/22  8:35 PM   Result Value Ref Range    Color YELLOW      Appearance CLEAR      Specific gravity 1.024 1.005 - 1.030      pH (UA) 7.5 5.0 - 8.0      Protein Negative NEG mg/dL    Glucose Negative NEG mg/dL    Ketone Negative NEG mg/dL    Bilirubin Negative NEG      Blood Negative NEG      Urobilinogen 0.2 0.2 - 1.0 EU/dL    Nitrites Negative NEG      Leukocyte Esterase Negative NEG         Radiologic Studies -   CT ABD PELV W CONT   Final Result      No acute findings. Prior left inguinal hernia repair with plug. Colonic diverticulosis without acute diverticulitis. Multiple nodules in the lung bases. If high risk, consider chest CT correlation   nonemergently. XR CHEST PA LAT    (Results Pending)   DUPLEX LOWER EXT VENOUS BILAT    (Results Pending)     No evidence of deep vein thrombosis in the bilateral lower extremities. Medical Decision Making   I am the first provider for this patient. I reviewed the vital signs, available nursing notes, past medical history, past surgical history, family history and social history.     Vital Signs-Reviewed the patient's vital signs. Pulse Oximetry Analysis -   95% on room air     Records Reviewed: Nursing Notes, Old Medical Records, Previous electrocardiograms, Previous Radiology Studies, and Previous Laboratory Studies (Time of Review: 7:33 PM)    ED Course: Progress Notes, Reevaluation, and Consults:  10:26 PM Reviewed results and plan with patient. Discussed need for close outpatient follow-up this week for reassessment. Discussed strict return precautions, including  chest pain, shortness of breath, or any other medical concerns. Printed CT report for his records. Notes he quit smoking years ago. Discussed importance of further outpatient work-up for findings. One or more blood pressure readings were noted elevated during the patient's presentation in the emergency department today. This abnormal reading has been cited in the patients' diagnosis, and they have been encouraged to follow up with their primary care physician, or referred to a consultant for further evaluation and treatment. Provider Notes (Medical Decision Making): 61-year-old male with history of hypertension who presents to the ED due to bilateral leg swelling x months. Afebrile, nontoxic-appearing, looks well. No chest pain or shortness of breath. No evidence of FUAD, CHF, DVT. Pt also notes abdominal pain x months. Labs essentially unremarkable, UA without evidence of infection, CT demonstrates no acute findings. Patient is stable for discharge with close outpatient follow-up for further assessment. Strict return precautions provided. Diagnosis     Clinical Impression:   1. Abdominal pain, generalized    2. Leg swelling    3. Primary hypertension    4.  Lung nodules        Disposition: home    Follow-up Information       Follow up With Specialties Details Why Contact Info    [facility] EMERGENCY DEPT Emergency Medicine  If symptoms worsen [address] [place] [id number]  [address]  Schedule an appointment as soon as possible for a visit   [place] [address]  [phone]             Discharge Medication List as of 12/5/2022 10:19 PM        CONTINUE these medications which have CHANGED    Details   lisinopril-hydroCHLOROthiazide (PRINZIDE, ZESTORETIC) 20-12.5 mg per tablet Take 1 Tablet by mouth daily. , Normal, Disp-30 Tablet, R-2      rosuvastatin (CRESTOR) 40 mg tablet Take 1 Tablet by mouth daily. , Normal, Disp-30 Tablet, R-2           CONTINUE these medications which have NOT CHANGED    Details   albuterol (PROVENTIL HFA, VENTOLIN HFA, PROAIR HFA) 90 mcg/actuation inhaler INHALE 1 PUFF BY MOUTH EVERY 4 HOURS AS NEEDED, Historical Med      aspirin 81 mg chewable tablet CHEW AND SWALLOW 1 TABLET BY MOUTH ONCE DAILY, Historical Med      indapamide (LOZOL) 2.5 mg tablet TAKE 1 TABLET BY MOUTH EVERY DAY IN THE MORNING, Historical Med      eplerenone (INSPRA) 25 mg tablet Take 25 mg by mouth daily. , Historical Med           STOP taking these medications       diclofenac (VOLTAREN) 1 % gel Comments:   Reason for Stopping:         methocarbamoL (Robaxin) 500 mg tablet Comments:   Reason for Stopping:         lidocaine (Lidoderm) 5 % Comments:   Reason for Stopping:               Dictation disclaimer:  Please note that this dictation was completed with [employer], the [employer] voice recognition software. Quite often unanticipated grammatical, syntax, homophones, and other interpretive errors are inadvertently transcribed by the computer software. Please disregard these errors. Please excuse any errors that have escaped final proofreading.

## 2023-01-15 ENCOUNTER — APPOINTMENT (OUTPATIENT)
Dept: GENERAL RADIOLOGY | Age: 58
End: 2023-01-15
Attending: PHYSICIAN ASSISTANT
Payer: MEDICAID

## 2023-01-15 ENCOUNTER — HOSPITAL ENCOUNTER (EMERGENCY)
Age: 58
Discharge: HOME | End: 2023-01-15
Attending: EMERGENCY MEDICINE
Payer: MEDICAID

## 2023-01-15 VITALS
OXYGEN SATURATION: 99 % | SYSTOLIC BLOOD PRESSURE: 137 MMHG | HEART RATE: 87 BPM | DIASTOLIC BLOOD PRESSURE: 96 MMHG | RESPIRATION RATE: 16 BRPM | TEMPERATURE: 98.2 F

## 2023-01-15 DIAGNOSIS — J20.9 ACUTE BRONCHITIS, UNSPECIFIED ORGANISM: ICD-10-CM

## 2023-01-15 DIAGNOSIS — R05.1 ACUTE COUGH: Primary | ICD-10-CM

## 2023-01-15 LAB
FLUAV RNA SPEC QL NAA+PROBE: NOT DETECTED
FLUBV RNA SPEC QL NAA+PROBE: NOT DETECTED
SARS-COV-2, COV2: NOT DETECTED

## 2023-01-15 PROCEDURE — 99283 EMERGENCY DEPT VISIT LOW MDM: CPT

## 2023-01-15 PROCEDURE — 71045 X-RAY EXAM CHEST 1 VIEW: CPT

## 2023-01-15 PROCEDURE — 87636 SARSCOV2 & INF A&B AMP PRB: CPT

## 2023-01-15 RX ORDER — DOXYCYCLINE HYCLATE 100 MG
100 TABLET ORAL 2 TIMES DAILY
Qty: 14 TABLET | Refills: 0 | Status: SHIPPED | OUTPATIENT
Start: 2023-01-15 | End: 2023-01-22

## 2023-01-15 RX ORDER — PREDNISONE 10 MG/1
TABLET ORAL
Qty: 21 TABLET | Refills: 0 | Status: SHIPPED | OUTPATIENT
Start: 2023-01-15

## 2023-01-15 RX ORDER — BENZONATATE 100 MG/1
100 CAPSULE ORAL
Qty: 30 CAPSULE | Refills: 0 | Status: SHIPPED | OUTPATIENT
Start: 2023-01-15 | End: 2023-01-22

## 2023-01-15 NOTE — ED PROVIDER NOTES
EMERGENCY DEPARTMENT HISTORY AND PHYSICAL EXAM    Date: 1/15/2023  Patient Name: Damian Valdovinos    History of Presenting Illness     Chief Complaint   Patient presents with    Flu Like Symptoms         History Provided By: patient     Chief Complaint: flu like sx  Duration: 1 week   Timing:  acute   Location: chest, upper resp  Quality: congested   Severity: moderate  Modifying Factors: OTC meds have not helped  Associated Symptoms: cough, congestion, scratchy throat      Additional History (Context): Damian Valdovinos is a 59 y.o. male with PMH COPD, htn, and arthritis who presents with c/o 1 week of congestion, cough, and scratchy throat for the past week. Pt denies SOB and chest pain. Sx are worse at night and have not been alleviated by OTC meds. Pt is also requesting a refill on his daily meds. No other complaints reported. PCP: Everette Wilburn NP    Current Outpatient Medications   Medication Sig Dispense Refill    doxycycline (VIBRA-TABS) 100 mg tablet Take 1 Tablet by mouth two (2) times a day for 7 days. 14 Tablet 0    benzonatate (Tessalon Perles) 100 mg capsule Take 1 Capsule by mouth three (3) times daily as needed for Cough for up to 7 days. 30 Capsule 0    predniSONE (STERAPRED DS) 10 mg dose pack Use as directed 21 Tablet 0    lisinopril-hydroCHLOROthiazide (PRINZIDE, ZESTORETIC) 20-12.5 mg per tablet Take 1 Tablet by mouth daily. 30 Tablet 2    rosuvastatin (CRESTOR) 40 mg tablet Take 1 Tablet by mouth daily. 30 Tablet 2    albuterol (PROVENTIL HFA, VENTOLIN HFA, PROAIR HFA) 90 mcg/actuation inhaler INHALE 1 PUFF BY MOUTH EVERY 4 HOURS AS NEEDED      aspirin 81 mg chewable tablet CHEW AND SWALLOW 1 TABLET BY MOUTH ONCE DAILY      indapamide (LOZOL) 2.5 mg tablet TAKE 1 TABLET BY MOUTH EVERY DAY IN THE MORNING      eplerenone (INSPRA) 25 mg tablet Take 25 mg by mouth daily.          Past History     Past Medical History:  Past Medical History:   Diagnosis Date    Arthritis     Chronic obstructive pulmonary disease ([facility] [address]     Hypertension        Past Surgical History:  Past Surgical History:   Procedure Laterality Date    HX HEENT      left inguinal hernia repair    HX ORTHOPAEDIC      right arm       Family History:  No family history on file. Social History:  Social History     Tobacco Use    Smoking status: Former     Types: Cigarettes     Quit date:      Years since quittin.0    Smokeless tobacco: Never   Substance Use Topics    Alcohol use: Not Currently     Alcohol/week: 5.0 standard drinks     Types: 5 Cans of beer per week    Drug use: No       Allergies:  No Known Allergies      Review of Systems   Review of Systems   Constitutional: Negative. Negative for chills and fever. HENT:  Positive for congestion. Negative for ear pain and rhinorrhea. Scratchy throat    Eyes: Negative. Negative for pain and redness. Respiratory:  Positive for cough. Negative for shortness of breath, wheezing and stridor. Cardiovascular: Negative. Negative for chest pain and leg swelling. Gastrointestinal: Negative. Negative for abdominal pain, constipation, diarrhea, nausea and vomiting. Genitourinary: Negative. Negative for dysuria and frequency. Musculoskeletal: Negative. Negative for back pain and neck pain. Skin: Negative. Negative for rash and wound. Neurological: Negative. Negative for dizziness, seizures, syncope and headaches. All other systems reviewed and are negative. All Other Systems Negative  Physical Exam     Vitals:    01/15/23 1501   BP: (!) 137/96   Pulse: 87   Resp: 16   Temp: 98.2 °F (36.8 °C)   SpO2: 99%     Physical Exam  Vitals and nursing note reviewed. Constitutional:       General: He is not in acute distress. Appearance: He is well-developed. He is not diaphoretic. HENT:      Head: Normocephalic and atraumatic. Nose: Congestion present. Mouth/Throat:      Mouth: Mucous membranes are moist.   Eyes:      General: No scleral icterus. Right eye: No discharge. Left eye: No discharge. Conjunctiva/sclera: Conjunctivae normal.   Cardiovascular:      Rate and Rhythm: Normal rate and regular rhythm. Heart sounds: Normal heart sounds. No murmur heard. No friction rub. No gallop. Pulmonary:      Effort: Pulmonary effort is normal. No respiratory distress. Breath sounds: Normal breath sounds. No stridor. No wheezing, rhonchi or rales. Musculoskeletal:         General: Normal range of motion. Cervical back: Normal range of motion and neck supple. Skin:     General: Skin is warm and dry. Findings: No erythema or rash. Neurological:      General: No focal deficit present. Mental Status: He is alert and oriented to person, place, and time. Mental status is at baseline. Coordination: Coordination normal.      Comments: Gait is steady and patient exhibits no evidence of ataxia. Patient is able to ambulate without difficulty. No focal neurological deficit noted. No facial droop, slurred speech, or evidence of altered mentation noted on exam.       Psychiatric:         Behavior: Behavior normal.         Thought Content: Thought content normal.              Diagnostic Study Results     Labs -     Recent Results (from the past 12 hour(s))   COVID-19 WITH INFLUENZA A/B    Collection Time: 01/15/23  3:01 PM   Result Value Ref Range    SARS-CoV-2 by PCR Not detected NOTD      Influenza A by PCR Not detected NOTD      Influenza B by PCR Not detected NOTD         Radiologic Studies -   XR CHEST SNGL V   Final Result   1. Mild basilar atelectasis otherwise No acute process identified. CT Results  (Last 48 hours)      None          CXR Results  (Last 48 hours)                 01/15/23 1526  XR CHEST SNGL V Final result    Impression:  1. Mild basilar atelectasis otherwise No acute process identified.         Narrative:  EXAM: Chest Radiograph       INDICATION:  SOB cough       TECHNIQUE: AP view of the chest COMPARISON: 12/5/2022. FINDINGS:   Mild linear opacities of the lung bases consistent with subsegmental. No focal   consolidation identified. No effusions identified. No pneumothorax identified. The cardiomediastinal silhouette is within normal limits. The osseous structures are unremarkable. Medical Decision Making   I am the first provider for this patient. I reviewed the vital signs, available nursing notes, past medical history, past surgical history, family history and social history. Vital Signs-Reviewed the patient's vital signs. Records Reviewed: Janneth Hernandez PA-C   Procedures:  Procedures    Provider Notes (Medical Decision Making): Impression:      Chest x-ray negative for definite acute process  Covid/flu negative    Sx likely secondary to bronchitis, given his hx of COPD and htn as well his productive cough, will cover with abx and recommend pcp follow-up. Return precautions advised. Pt agrees. Janneth Hernandez PA-C     MED RECONCILIATION:  No current facility-administered medications for this encounter. Current Outpatient Medications   Medication Sig    doxycycline (VIBRA-TABS) 100 mg tablet Take 1 Tablet by mouth two (2) times a day for 7 days. benzonatate (Tessalon Perles) 100 mg capsule Take 1 Capsule by mouth three (3) times daily as needed for Cough for up to 7 days. predniSONE (STERAPRED DS) 10 mg dose pack Use as directed    lisinopril-hydroCHLOROthiazide (PRINZIDE, ZESTORETIC) 20-12.5 mg per tablet Take 1 Tablet by mouth daily. rosuvastatin (CRESTOR) 40 mg tablet Take 1 Tablet by mouth daily.     albuterol (PROVENTIL HFA, VENTOLIN HFA, PROAIR HFA) 90 mcg/actuation inhaler INHALE 1 PUFF BY MOUTH EVERY 4 HOURS AS NEEDED    aspirin 81 mg chewable tablet CHEW AND SWALLOW 1 TABLET BY MOUTH ONCE DAILY    indapamide (LOZOL) 2.5 mg tablet TAKE 1 TABLET BY MOUTH EVERY DAY IN THE MORNING    eplerenone (INSPRA) 25 mg tablet Take 25 mg by mouth daily. Disposition:  D/c    DISCHARGE NOTE:   Patient is stable for discharge at this time. I have discussed all the findings from today's work up with the patient, including lab results and imaging. I have answered all questions. Rx for tessalon prednisone and doxy given. Rest and close follow-up with the PCP recommended this week. Return to the ED immediately for any new or worsening symptoms. April Neema Rivera PA-C     Follow-up Information       Follow up With Specialties Details Why Contact Info    Manish Taylor NP Nurse Practitioner In 1 week  [address] [facility] [address]  [phone]      [facility] EMERGENCY DEPT Emergency Medicine  As needed, If symptoms worsen [address] [facility] [address]  [phone]            Discharge Medication List as of 1/15/2023  3:55 PM        START taking these medications    Details   doxycycline (VIBRA-TABS) 100 mg tablet Take 1 Tablet by mouth two (2) times a day for 7 days. , Normal, Disp-14 Tablet, R-0      benzonatate (Tessalon Perles) 100 mg capsule Take 1 Capsule by mouth three (3) times daily as needed for Cough for up to 7 days. , Normal, Disp-30 Capsule, R-0      predniSONE (STERAPRED DS) 10 mg dose pack Use as directed, Normal, Disp-21 Tablet, R-0           CONTINUE these medications which have NOT CHANGED    Details   lisinopril-hydroCHLOROthiazide (PRINZIDE, ZESTORETIC) 20-12.5 mg per tablet Take 1 Tablet by mouth daily. , Normal, Disp-30 Tablet, R-2      rosuvastatin (CRESTOR) 40 mg tablet Take 1 Tablet by mouth daily. , Normal, Disp-30 Tablet, R-2      albuterol (PROVENTIL HFA, VENTOLIN HFA, PROAIR HFA) 90 mcg/actuation inhaler INHALE 1 PUFF BY MOUTH EVERY 4 HOURS AS NEEDED, Historical Med      aspirin 81 mg chewable tablet CHEW AND SWALLOW 1 TABLET BY MOUTH ONCE DAILY, Historical Med      indapamide (LOZOL) 2.5 mg tablet TAKE 1 TABLET BY MOUTH EVERY DAY IN THE MORNING, Historical Med      eplerenone (INSPRA) 25 mg tablet Take 25 mg by mouth daily., Historical Med                 Diagnosis     Clinical Impression:   1.  Acute cough    2. Acute bronchitis, unspecified organism

## 2023-01-15 NOTE — DISCHARGE INSTRUCTIONS
[employer]" Activation    Thank you for requesting access to [employer]. Please follow the instructions below to securely access and download your online medical record. [employer] allows you to send messages to your doctor, view your test results, renew your prescriptions, schedule appointments, and more. How Do I Sign Up? In your internet browser, go to [email or web address]  Click on the First Time User? Click Here link in the Sign In box. You will be redirect to the New Member Sign Up page. Enter your [employer] Access Code exactly as it appears below. You will not need to use this code after youve completed the sign-up process. If you do not sign up before the expiration date, you must request a new code. [employer] Access Code: Activation code not generated  Current [employer] Status: Active (This is the date your [employer] access code will )    Enter the last four digits of your Social Security Number (xxxx) and Date of Birth (mm/dd/yyyy) as indicated and click Submit. You will be taken to the next sign-up page. Create a [employer] ID. This will be your [employer] login ID and cannot be changed, so think of one that is secure and easy to remember. Create a [employer] password. You can change your password at any time. Enter your Password Reset Question and Answer. This can be used at a later time if you forget your password. Enter your e-mail address. You will receive e-mail notification when new information is available in [address] Click Sign Up. You can now view and download portions of your medical record. Click the [place] link to download a portable copy of your medical information. Additional Information    If you have questions, please visit the Frequently Asked Questions section of the [employer] website at [email or web address] Remember, [employer] is NOT to be used for urgent needs. For medical emergencies, dial 911.

## 2023-01-15 NOTE — Clinical Note
Rahul Downey was seen and treated in our emergency department on 1/15/2023. To whom it may concern:     Mr. Rahul Downey was seen in the ED on 1/15/2023 and may be excused from work for 1 week.      Sincerely,     EDUARD Jennings MD

## 2023-06-02 ENCOUNTER — APPOINTMENT (OUTPATIENT)
Facility: HOSPITAL | Age: 58
End: 2023-06-02
Payer: COMMERCIAL

## 2023-06-02 ENCOUNTER — HOSPITAL ENCOUNTER (INPATIENT)
Facility: HOSPITAL | Age: 58
LOS: 7 days | Discharge: HOME | End: 2023-06-09
Attending: EMERGENCY MEDICINE | Admitting: STUDENT IN AN ORGANIZED HEALTH CARE EDUCATION/TRAINING PROGRAM
Payer: COMMERCIAL

## 2023-06-02 DIAGNOSIS — K56.609 SBO (SMALL BOWEL OBSTRUCTION) (HCC): Primary | ICD-10-CM

## 2023-06-02 PROBLEM — I10 HYPERTENSION: Status: ACTIVE | Noted: 2023-06-02

## 2023-06-02 PROBLEM — E78.5 HYPERLIPIDEMIA: Status: ACTIVE | Noted: 2023-06-02

## 2023-06-02 PROBLEM — G95.9 CERVICAL MYELOPATHY (HCC): Status: ACTIVE | Noted: 2023-06-02

## 2023-06-02 PROBLEM — K92.2 LOWER GI BLEED: Status: ACTIVE | Noted: 2023-06-02

## 2023-06-02 LAB
ALBUMIN SERPL-MCNC: 4 G/DL (ref 3.4–5)
ALBUMIN/GLOB SERPL: 0.9 (ref 0.8–1.7)
ALP SERPL-CCNC: 150 U/L (ref 45–117)
ALT SERPL-CCNC: 45 U/L (ref 16–61)
ANION GAP SERPL CALC-SCNC: 4 MMOL/L (ref 3–18)
APPEARANCE UR: CLEAR
AST SERPL-CCNC: 38 U/L (ref 10–38)
BASOPHILS # BLD: 0 K/UL (ref 0–0.1)
BASOPHILS NFR BLD: 1 % (ref 0–2)
BILIRUB SERPL-MCNC: 0.9 MG/DL (ref 0.2–1)
BILIRUB UR QL: ABNORMAL
BUN SERPL-MCNC: 10 MG/DL (ref 7–18)
BUN/CREAT SERPL: 9 (ref 12–20)
CALCIUM SERPL-MCNC: 10 MG/DL (ref 8.5–10.1)
CHLORIDE SERPL-SCNC: 107 MMOL/L (ref 100–111)
CO2 SERPL-SCNC: 27 MMOL/L (ref 21–32)
COLOR UR: ABNORMAL
CREAT SERPL-MCNC: 1.17 MG/DL (ref 0.6–1.3)
DIFFERENTIAL METHOD BLD: ABNORMAL
EOSINOPHIL # BLD: 0.3 K/UL (ref 0–0.4)
EOSINOPHIL NFR BLD: 5 % (ref 0–5)
EPITH CASTS URNS QL MICRO: ABNORMAL /LPF (ref 0–5)
ERYTHROCYTE [DISTWIDTH] IN BLOOD BY AUTOMATED COUNT: 15.9 % (ref 11.6–14.5)
GLOBULIN SER CALC-MCNC: 4.5 G/DL (ref 2–4)
GLUCOSE SERPL-MCNC: 105 MG/DL (ref 74–99)
GLUCOSE UR STRIP.AUTO-MCNC: NEGATIVE MG/DL
HCT VFR BLD AUTO: 42.4 % (ref 36–48)
HEMOCCULT STL QL: POSITIVE
HGB BLD-MCNC: 13.3 G/DL (ref 13–16)
HGB UR QL STRIP: NEGATIVE
HYALINE CASTS URNS QL MICRO: ABNORMAL /LPF (ref 0–2)
IMM GRANULOCYTES # BLD AUTO: 0 K/UL (ref 0–0.04)
IMM GRANULOCYTES NFR BLD AUTO: 0 % (ref 0–0.5)
KETONES UR QL STRIP.AUTO: ABNORMAL MG/DL
LEUKOCYTE ESTERASE UR QL STRIP.AUTO: NEGATIVE
LIPASE SERPL-CCNC: 86 U/L (ref 73–393)
LYMPHOCYTES # BLD: 1.9 K/UL (ref 0.9–3.6)
LYMPHOCYTES NFR BLD: 32 % (ref 21–52)
MCH RBC QN AUTO: 23.9 PG (ref 24–34)
MCHC RBC AUTO-ENTMCNC: 31.4 G/DL (ref 31–37)
MCV RBC AUTO: 76.3 FL (ref 78–100)
MONOCYTES # BLD: 0.6 K/UL (ref 0.05–1.2)
MONOCYTES NFR BLD: 11 % (ref 3–10)
MUCOUS THREADS URNS QL MICRO: ABNORMAL /LPF
NEUTS SEG # BLD: 3.1 K/UL (ref 1.8–8)
NEUTS SEG NFR BLD: 52 % (ref 40–73)
NITRITE UR QL STRIP.AUTO: NEGATIVE
NRBC # BLD: 0 K/UL (ref 0–0.01)
NRBC BLD-RTO: 0 PER 100 WBC
PH UR STRIP: 5 (ref 5–8)
PLATELET # BLD AUTO: 352 K/UL (ref 135–420)
PMV BLD AUTO: 8.8 FL (ref 9.2–11.8)
POTASSIUM SERPL-SCNC: 4 MMOL/L (ref 3.5–5.5)
PROT SERPL-MCNC: 8.5 G/DL (ref 6.4–8.2)
PROT UR STRIP-MCNC: 30 MG/DL
RBC # BLD AUTO: 5.56 M/UL (ref 4.35–5.65)
RBC #/AREA URNS HPF: ABNORMAL /HPF (ref 0–5)
SODIUM SERPL-SCNC: 138 MMOL/L (ref 136–145)
SP GR UR REFRACTOMETRY: >1.03 (ref 1–1.03)
UROBILINOGEN UR QL STRIP.AUTO: 1 EU/DL (ref 0.2–1)
WBC # BLD AUTO: 5.9 K/UL (ref 4.6–13.2)
WBC URNS QL MICRO: ABNORMAL /HPF (ref 0–4)

## 2023-06-02 PROCEDURE — 83690 ASSAY OF LIPASE: CPT

## 2023-06-02 PROCEDURE — A4216 STERILE WATER/SALINE, 10 ML: HCPCS

## 2023-06-02 PROCEDURE — 96374 THER/PROPH/DIAG INJ IV PUSH: CPT

## 2023-06-02 PROCEDURE — 93005 ELECTROCARDIOGRAM TRACING: CPT | Performed by: EMERGENCY MEDICINE

## 2023-06-02 PROCEDURE — 6360000002 HC RX W HCPCS

## 2023-06-02 PROCEDURE — 74177 CT ABD & PELVIS W/CONTRAST: CPT

## 2023-06-02 PROCEDURE — C9113 INJ PANTOPRAZOLE SODIUM, VIA: HCPCS

## 2023-06-02 PROCEDURE — 2580000003 HC RX 258: Performed by: PHYSICIAN ASSISTANT

## 2023-06-02 PROCEDURE — 6360000004 HC RX CONTRAST MEDICATION: Performed by: PHYSICIAN ASSISTANT

## 2023-06-02 PROCEDURE — 74018 RADEX ABDOMEN 1 VIEW: CPT

## 2023-06-02 PROCEDURE — 99222 1ST HOSP IP/OBS MODERATE 55: CPT

## 2023-06-02 PROCEDURE — 81001 URINALYSIS AUTO W/SCOPE: CPT

## 2023-06-02 PROCEDURE — A4216 STERILE WATER/SALINE, 10 ML: HCPCS | Performed by: PHYSICIAN ASSISTANT

## 2023-06-02 PROCEDURE — 85025 COMPLETE CBC W/AUTO DIFF WBC: CPT

## 2023-06-02 PROCEDURE — 99285 EMERGENCY DEPT VISIT HI MDM: CPT

## 2023-06-02 PROCEDURE — 1100000000 HC RM PRIVATE

## 2023-06-02 PROCEDURE — 80053 COMPREHEN METABOLIC PANEL: CPT

## 2023-06-02 PROCEDURE — 2580000003 HC RX 258

## 2023-06-02 PROCEDURE — 96375 TX/PRO/DX INJ NEW DRUG ADDON: CPT

## 2023-06-02 PROCEDURE — 82272 OCCULT BLD FECES 1-3 TESTS: CPT

## 2023-06-02 PROCEDURE — 2500000003 HC RX 250 WO HCPCS: Performed by: PHYSICIAN ASSISTANT

## 2023-06-02 RX ORDER — POLYETHYLENE GLYCOL 3350 17 G/17G
17 POWDER, FOR SOLUTION ORAL DAILY PRN
Status: DISCONTINUED | OUTPATIENT
Start: 2023-06-02 | End: 2023-06-05

## 2023-06-02 RX ORDER — ROSUVASTATIN CALCIUM 20 MG/1
40 TABLET, COATED ORAL DAILY
Status: DISCONTINUED | OUTPATIENT
Start: 2023-06-03 | End: 2023-06-09 | Stop reason: HOSPADM

## 2023-06-02 RX ORDER — IPRATROPIUM BROMIDE AND ALBUTEROL SULFATE 2.5; .5 MG/3ML; MG/3ML
1 SOLUTION RESPIRATORY (INHALATION) EVERY 4 HOURS PRN
Status: DISCONTINUED | OUTPATIENT
Start: 2023-06-02 | End: 2023-06-09 | Stop reason: HOSPADM

## 2023-06-02 RX ORDER — GABAPENTIN 300 MG/1
300 CAPSULE ORAL DAILY
Status: DISCONTINUED | OUTPATIENT
Start: 2023-06-03 | End: 2023-06-09 | Stop reason: HOSPADM

## 2023-06-02 RX ORDER — MORPHINE SULFATE 4 MG/ML
4 INJECTION, SOLUTION INTRAMUSCULAR; INTRAVENOUS
Status: DISCONTINUED | OUTPATIENT
Start: 2023-06-02 | End: 2023-06-02

## 2023-06-02 RX ORDER — SODIUM CHLORIDE 9 MG/ML
INJECTION, SOLUTION INTRAVENOUS CONTINUOUS
Status: DISPENSED | OUTPATIENT
Start: 2023-06-02 | End: 2023-06-03

## 2023-06-02 RX ORDER — ONDANSETRON 4 MG/1
4 TABLET, ORALLY DISINTEGRATING ORAL EVERY 8 HOURS PRN
Status: DISCONTINUED | OUTPATIENT
Start: 2023-06-02 | End: 2023-06-09 | Stop reason: HOSPADM

## 2023-06-02 RX ORDER — HYDROMORPHONE HYDROCHLORIDE 1 MG/ML
1 INJECTION, SOLUTION INTRAMUSCULAR; INTRAVENOUS; SUBCUTANEOUS
Status: COMPLETED | OUTPATIENT
Start: 2023-06-02 | End: 2023-06-02

## 2023-06-02 RX ORDER — SODIUM CHLORIDE 0.9 % (FLUSH) 0.9 %
5-40 SYRINGE (ML) INJECTION EVERY 12 HOURS SCHEDULED
Status: DISCONTINUED | OUTPATIENT
Start: 2023-06-02 | End: 2023-06-09 | Stop reason: HOSPADM

## 2023-06-02 RX ORDER — ONDANSETRON 2 MG/ML
4 INJECTION INTRAMUSCULAR; INTRAVENOUS EVERY 6 HOURS PRN
Status: DISCONTINUED | OUTPATIENT
Start: 2023-06-02 | End: 2023-06-09 | Stop reason: HOSPADM

## 2023-06-02 RX ORDER — SODIUM CHLORIDE 0.9 % (FLUSH) 0.9 %
5-40 SYRINGE (ML) INJECTION PRN
Status: DISCONTINUED | OUTPATIENT
Start: 2023-06-02 | End: 2023-06-09 | Stop reason: HOSPADM

## 2023-06-02 RX ORDER — ACETAMINOPHEN 325 MG/1
650 TABLET ORAL EVERY 6 HOURS PRN
Status: DISCONTINUED | OUTPATIENT
Start: 2023-06-02 | End: 2023-06-09 | Stop reason: HOSPADM

## 2023-06-02 RX ORDER — GABAPENTIN 300 MG/1
300 CAPSULE ORAL DAILY
COMMUNITY
Start: 2021-11-01

## 2023-06-02 RX ORDER — ACETAMINOPHEN 650 MG/1
650 SUPPOSITORY RECTAL EVERY 6 HOURS PRN
Status: DISCONTINUED | OUTPATIENT
Start: 2023-06-02 | End: 2023-06-09 | Stop reason: HOSPADM

## 2023-06-02 RX ADMIN — FAMOTIDINE 20 MG: 10 INJECTION INTRAVENOUS at 20:27

## 2023-06-02 RX ADMIN — IOPAMIDOL 80 ML: 612 INJECTION, SOLUTION INTRAVENOUS at 19:23

## 2023-06-02 RX ADMIN — HYDROMORPHONE HYDROCHLORIDE 1 MG: 1 INJECTION, SOLUTION INTRAMUSCULAR; INTRAVENOUS; SUBCUTANEOUS at 20:20

## 2023-06-02 RX ADMIN — HYDROMORPHONE HYDROCHLORIDE 1 MG: 1 INJECTION, SOLUTION INTRAMUSCULAR; INTRAVENOUS; SUBCUTANEOUS at 22:05

## 2023-06-02 RX ADMIN — SODIUM CHLORIDE, PRESERVATIVE FREE 40 MG: 5 INJECTION INTRAVENOUS at 22:06

## 2023-06-02 ASSESSMENT — PAIN DESCRIPTION - ORIENTATION
ORIENTATION: UPPER
ORIENTATION: UPPER
ORIENTATION: LEFT

## 2023-06-02 ASSESSMENT — PAIN DESCRIPTION - DESCRIPTORS: DESCRIPTORS: BURNING

## 2023-06-02 ASSESSMENT — PAIN DESCRIPTION - LOCATION
LOCATION: ABDOMEN

## 2023-06-02 ASSESSMENT — ENCOUNTER SYMPTOMS
EYE REDNESS: 0
SHORTNESS OF BREATH: 0
CONSTIPATION: 0
RHINORRHEA: 0
SORE THROAT: 0
STRIDOR: 0
COUGH: 0
DIARRHEA: 0
NAUSEA: 0
BLOOD IN STOOL: 1
BACK PAIN: 0
EYE DISCHARGE: 0
ABDOMINAL PAIN: 1
WHEEZING: 0
VOMITING: 0

## 2023-06-02 ASSESSMENT — PAIN - FUNCTIONAL ASSESSMENT: PAIN_FUNCTIONAL_ASSESSMENT: 0-10

## 2023-06-02 ASSESSMENT — PAIN SCALES - GENERAL
PAINLEVEL_OUTOF10: 9
PAINLEVEL_OUTOF10: 10
PAINLEVEL_OUTOF10: 7

## 2023-06-03 ENCOUNTER — APPOINTMENT (OUTPATIENT)
Facility: HOSPITAL | Age: 58
End: 2023-06-03
Payer: COMMERCIAL

## 2023-06-03 LAB
ABO + RH BLD: NORMAL
ANION GAP SERPL CALC-SCNC: 5 MMOL/L (ref 3–18)
BASOPHILS # BLD: 0 K/UL (ref 0–0.1)
BASOPHILS NFR BLD: 0 % (ref 0–2)
BLOOD GROUP ANTIBODIES SERPL: NORMAL
BUN SERPL-MCNC: 9 MG/DL (ref 7–18)
BUN/CREAT SERPL: 9 (ref 12–20)
CALCIUM SERPL-MCNC: 9.1 MG/DL (ref 8.5–10.1)
CHLORIDE SERPL-SCNC: 109 MMOL/L (ref 100–111)
CO2 SERPL-SCNC: 25 MMOL/L (ref 21–32)
CREAT SERPL-MCNC: 0.97 MG/DL (ref 0.6–1.3)
DIFFERENTIAL METHOD BLD: ABNORMAL
EKG ATRIAL RATE: 96 BPM
EKG DIAGNOSIS: NORMAL
EKG P AXIS: 79 DEGREES
EKG P-R INTERVAL: 162 MS
EKG Q-T INTERVAL: 350 MS
EKG QRS DURATION: 100 MS
EKG QTC CALCULATION (BAZETT): 442 MS
EKG R AXIS: 44 DEGREES
EKG T AXIS: 34 DEGREES
EKG VENTRICULAR RATE: 96 BPM
EOSINOPHIL # BLD: 0.2 K/UL (ref 0–0.4)
EOSINOPHIL NFR BLD: 4 % (ref 0–5)
ERYTHROCYTE [DISTWIDTH] IN BLOOD BY AUTOMATED COUNT: 15.9 % (ref 11.6–14.5)
GLUCOSE SERPL-MCNC: 95 MG/DL (ref 74–99)
HCT VFR BLD AUTO: 37.2 % (ref 36–48)
HCT VFR BLD AUTO: 37.8 % (ref 36–48)
HCT VFR BLD AUTO: 39.5 % (ref 36–48)
HGB BLD-MCNC: 11.6 G/DL (ref 13–16)
HGB BLD-MCNC: 11.8 G/DL (ref 13–16)
HGB BLD-MCNC: 12.2 G/DL (ref 13–16)
IMM GRANULOCYTES # BLD AUTO: 0 K/UL (ref 0–0.04)
IMM GRANULOCYTES NFR BLD AUTO: 0 % (ref 0–0.5)
LYMPHOCYTES # BLD: 1.3 K/UL (ref 0.9–3.6)
LYMPHOCYTES NFR BLD: 23 % (ref 21–52)
MCH RBC QN AUTO: 23.6 PG (ref 24–34)
MCHC RBC AUTO-ENTMCNC: 30.9 G/DL (ref 31–37)
MCV RBC AUTO: 76.6 FL (ref 78–100)
MONOCYTES # BLD: 0.6 K/UL (ref 0.05–1.2)
MONOCYTES NFR BLD: 11 % (ref 3–10)
NEUTS SEG # BLD: 3.6 K/UL (ref 1.8–8)
NEUTS SEG NFR BLD: 62 % (ref 40–73)
NRBC # BLD: 0 K/UL (ref 0–0.01)
NRBC BLD-RTO: 0 PER 100 WBC
PLATELET # BLD AUTO: 296 K/UL (ref 135–420)
PMV BLD AUTO: 8.9 FL (ref 9.2–11.8)
POTASSIUM SERPL-SCNC: 3.9 MMOL/L (ref 3.5–5.5)
RBC # BLD AUTO: 5.16 M/UL (ref 4.35–5.65)
SODIUM SERPL-SCNC: 139 MMOL/L (ref 136–145)
SPECIMEN EXP DATE BLD: NORMAL
WBC # BLD AUTO: 5.7 K/UL (ref 4.6–13.2)

## 2023-06-03 PROCEDURE — 80048 BASIC METABOLIC PNL TOTAL CA: CPT

## 2023-06-03 PROCEDURE — 86900 BLOOD TYPING SEROLOGIC ABO: CPT

## 2023-06-03 PROCEDURE — 86850 RBC ANTIBODY SCREEN: CPT

## 2023-06-03 PROCEDURE — 93010 ELECTROCARDIOGRAM REPORT: CPT | Performed by: INTERNAL MEDICINE

## 2023-06-03 PROCEDURE — 86901 BLOOD TYPING SEROLOGIC RH(D): CPT

## 2023-06-03 PROCEDURE — 6360000002 HC RX W HCPCS

## 2023-06-03 PROCEDURE — C9113 INJ PANTOPRAZOLE SODIUM, VIA: HCPCS

## 2023-06-03 PROCEDURE — 71045 X-RAY EXAM CHEST 1 VIEW: CPT

## 2023-06-03 PROCEDURE — A4216 STERILE WATER/SALINE, 10 ML: HCPCS

## 2023-06-03 PROCEDURE — 99221 1ST HOSP IP/OBS SF/LOW 40: CPT | Performed by: SURGERY

## 2023-06-03 PROCEDURE — 74018 RADEX ABDOMEN 1 VIEW: CPT

## 2023-06-03 PROCEDURE — 85025 COMPLETE CBC W/AUTO DIFF WBC: CPT

## 2023-06-03 PROCEDURE — 6370000000 HC RX 637 (ALT 250 FOR IP)

## 2023-06-03 PROCEDURE — 1100000000 HC RM PRIVATE

## 2023-06-03 PROCEDURE — 85014 HEMATOCRIT: CPT

## 2023-06-03 PROCEDURE — 6360000002 HC RX W HCPCS: Performed by: INTERNAL MEDICINE

## 2023-06-03 PROCEDURE — 36415 COLL VENOUS BLD VENIPUNCTURE: CPT

## 2023-06-03 PROCEDURE — 97165 OT EVAL LOW COMPLEX 30 MIN: CPT

## 2023-06-03 PROCEDURE — 2580000003 HC RX 258

## 2023-06-03 PROCEDURE — 85018 HEMOGLOBIN: CPT

## 2023-06-03 RX ADMIN — HYDROMORPHONE HYDROCHLORIDE 0.5 MG: 1 INJECTION, SOLUTION INTRAMUSCULAR; INTRAVENOUS; SUBCUTANEOUS at 10:48

## 2023-06-03 RX ADMIN — GABAPENTIN 300 MG: 300 CAPSULE ORAL at 08:54

## 2023-06-03 RX ADMIN — SODIUM CHLORIDE, PRESERVATIVE FREE 40 MG: 5 INJECTION INTRAVENOUS at 09:03

## 2023-06-03 RX ADMIN — SODIUM CHLORIDE, PRESERVATIVE FREE 10 ML: 5 INJECTION INTRAVENOUS at 20:16

## 2023-06-03 RX ADMIN — ACETAMINOPHEN 325MG 650 MG: 325 TABLET ORAL at 09:02

## 2023-06-03 RX ADMIN — HYDROMORPHONE HYDROCHLORIDE 0.5 MG: 1 INJECTION, SOLUTION INTRAMUSCULAR; INTRAVENOUS; SUBCUTANEOUS at 20:15

## 2023-06-03 RX ADMIN — SODIUM CHLORIDE, PRESERVATIVE FREE 40 MG: 5 INJECTION INTRAVENOUS at 21:39

## 2023-06-03 RX ADMIN — ROSUVASTATIN CALCIUM 40 MG: 20 TABLET, COATED ORAL at 08:54

## 2023-06-03 ASSESSMENT — PAIN SCALES - GENERAL
PAINLEVEL_OUTOF10: 6
PAINLEVEL_OUTOF10: 5
PAINLEVEL_OUTOF10: 2
PAINLEVEL_OUTOF10: 4
PAINLEVEL_OUTOF10: 7

## 2023-06-03 ASSESSMENT — PAIN DESCRIPTION - LOCATION
LOCATION: ABDOMEN

## 2023-06-03 ASSESSMENT — ENCOUNTER SYMPTOMS
CHEST TIGHTNESS: 0
BLOOD IN STOOL: 1
ABDOMINAL PAIN: 1
SHORTNESS OF BREATH: 0

## 2023-06-03 ASSESSMENT — PAIN DESCRIPTION - DESCRIPTORS
DESCRIPTORS: ACHING;BURNING
DESCRIPTORS: ACHING
DESCRIPTORS: ACHING;CRAMPING

## 2023-06-03 ASSESSMENT — PAIN - FUNCTIONAL ASSESSMENT: PAIN_FUNCTIONAL_ASSESSMENT: ACTIVITIES ARE NOT PREVENTED

## 2023-06-03 ASSESSMENT — PAIN DESCRIPTION - PAIN TYPE: TYPE: ACUTE PAIN

## 2023-06-04 ENCOUNTER — APPOINTMENT (OUTPATIENT)
Facility: HOSPITAL | Age: 58
End: 2023-06-04
Payer: COMMERCIAL

## 2023-06-04 LAB
ANION GAP SERPL CALC-SCNC: 7 MMOL/L (ref 3–18)
BASOPHILS # BLD: 0 K/UL (ref 0–0.1)
BASOPHILS NFR BLD: 1 % (ref 0–2)
BUN SERPL-MCNC: 11 MG/DL (ref 7–18)
BUN/CREAT SERPL: 11 (ref 12–20)
CALCIUM SERPL-MCNC: 8.7 MG/DL (ref 8.5–10.1)
CHLORIDE SERPL-SCNC: 111 MMOL/L (ref 100–111)
CO2 SERPL-SCNC: 22 MMOL/L (ref 21–32)
CREAT SERPL-MCNC: 1 MG/DL (ref 0.6–1.3)
DIFFERENTIAL METHOD BLD: ABNORMAL
EOSINOPHIL # BLD: 0.4 K/UL (ref 0–0.4)
EOSINOPHIL NFR BLD: 6 % (ref 0–5)
ERYTHROCYTE [DISTWIDTH] IN BLOOD BY AUTOMATED COUNT: 15.7 % (ref 11.6–14.5)
GLUCOSE SERPL-MCNC: 67 MG/DL (ref 74–99)
HCT VFR BLD AUTO: 37 % (ref 36–48)
HCT VFR BLD AUTO: 37.1 % (ref 36–48)
HCT VFR BLD AUTO: 37.5 % (ref 36–48)
HCT VFR BLD AUTO: 38.3 % (ref 36–48)
HGB BLD-MCNC: 11.3 G/DL (ref 13–16)
HGB BLD-MCNC: 11.5 G/DL (ref 13–16)
HGB BLD-MCNC: 11.6 G/DL (ref 13–16)
HGB BLD-MCNC: 11.7 G/DL (ref 13–16)
IMM GRANULOCYTES # BLD AUTO: 0 K/UL (ref 0–0.04)
IMM GRANULOCYTES NFR BLD AUTO: 0 % (ref 0–0.5)
LYMPHOCYTES # BLD: 1.7 K/UL (ref 0.9–3.6)
LYMPHOCYTES NFR BLD: 30 % (ref 21–52)
MCH RBC QN AUTO: 23.5 PG (ref 24–34)
MCHC RBC AUTO-ENTMCNC: 30.5 G/DL (ref 31–37)
MCV RBC AUTO: 77.1 FL (ref 78–100)
MONOCYTES # BLD: 0.6 K/UL (ref 0.05–1.2)
MONOCYTES NFR BLD: 11 % (ref 3–10)
NEUTS SEG # BLD: 3 K/UL (ref 1.8–8)
NEUTS SEG NFR BLD: 53 % (ref 40–73)
NRBC # BLD: 0 K/UL (ref 0–0.01)
NRBC BLD-RTO: 0 PER 100 WBC
PLATELET # BLD AUTO: 288 K/UL (ref 135–420)
PMV BLD AUTO: 9.6 FL (ref 9.2–11.8)
POTASSIUM SERPL-SCNC: 3.9 MMOL/L (ref 3.5–5.5)
RBC # BLD AUTO: 4.8 M/UL (ref 4.35–5.65)
SODIUM SERPL-SCNC: 140 MMOL/L (ref 136–145)
WBC # BLD AUTO: 5.7 K/UL (ref 4.6–13.2)

## 2023-06-04 PROCEDURE — 85025 COMPLETE CBC W/AUTO DIFF WBC: CPT

## 2023-06-04 PROCEDURE — 6360000002 HC RX W HCPCS

## 2023-06-04 PROCEDURE — 2580000003 HC RX 258

## 2023-06-04 PROCEDURE — 36415 COLL VENOUS BLD VENIPUNCTURE: CPT

## 2023-06-04 PROCEDURE — 6370000000 HC RX 637 (ALT 250 FOR IP)

## 2023-06-04 PROCEDURE — 94761 N-INVAS EAR/PLS OXIMETRY MLT: CPT

## 2023-06-04 PROCEDURE — 85018 HEMOGLOBIN: CPT

## 2023-06-04 PROCEDURE — 6360000002 HC RX W HCPCS: Performed by: INTERNAL MEDICINE

## 2023-06-04 PROCEDURE — 97161 PT EVAL LOW COMPLEX 20 MIN: CPT

## 2023-06-04 PROCEDURE — 80048 BASIC METABOLIC PNL TOTAL CA: CPT

## 2023-06-04 PROCEDURE — 6370000000 HC RX 637 (ALT 250 FOR IP): Performed by: FAMILY MEDICINE

## 2023-06-04 PROCEDURE — 74018 RADEX ABDOMEN 1 VIEW: CPT

## 2023-06-04 PROCEDURE — A4216 STERILE WATER/SALINE, 10 ML: HCPCS

## 2023-06-04 PROCEDURE — 1100000000 HC RM PRIVATE

## 2023-06-04 PROCEDURE — 85014 HEMATOCRIT: CPT

## 2023-06-04 PROCEDURE — 99231 SBSQ HOSP IP/OBS SF/LOW 25: CPT | Performed by: SURGERY

## 2023-06-04 PROCEDURE — C9113 INJ PANTOPRAZOLE SODIUM, VIA: HCPCS

## 2023-06-04 RX ORDER — HYDROCHLOROTHIAZIDE 12.5 MG/1
12.5 CAPSULE, GELATIN COATED ORAL DAILY
Status: DISCONTINUED | OUTPATIENT
Start: 2023-06-04 | End: 2023-06-09 | Stop reason: HOSPADM

## 2023-06-04 RX ORDER — LISINOPRIL 20 MG/1
20 TABLET ORAL DAILY
Status: DISCONTINUED | OUTPATIENT
Start: 2023-06-04 | End: 2023-06-09 | Stop reason: HOSPADM

## 2023-06-04 RX ADMIN — HYDROMORPHONE HYDROCHLORIDE 0.5 MG: 1 INJECTION, SOLUTION INTRAMUSCULAR; INTRAVENOUS; SUBCUTANEOUS at 08:59

## 2023-06-04 RX ADMIN — SODIUM CHLORIDE, PRESERVATIVE FREE 10 ML: 5 INJECTION INTRAVENOUS at 20:22

## 2023-06-04 RX ADMIN — HYDROCHLOROTHIAZIDE 12.5 MG: 12.5 CAPSULE ORAL at 15:35

## 2023-06-04 RX ADMIN — HYDROMORPHONE HYDROCHLORIDE 0.5 MG: 1 INJECTION, SOLUTION INTRAMUSCULAR; INTRAVENOUS; SUBCUTANEOUS at 20:19

## 2023-06-04 RX ADMIN — HYDROMORPHONE HYDROCHLORIDE 0.5 MG: 1 INJECTION, SOLUTION INTRAMUSCULAR; INTRAVENOUS; SUBCUTANEOUS at 13:52

## 2023-06-04 RX ADMIN — HYDROMORPHONE HYDROCHLORIDE 0.5 MG: 1 INJECTION, SOLUTION INTRAMUSCULAR; INTRAVENOUS; SUBCUTANEOUS at 00:04

## 2023-06-04 RX ADMIN — GABAPENTIN 300 MG: 300 CAPSULE ORAL at 08:41

## 2023-06-04 RX ADMIN — ROSUVASTATIN CALCIUM 40 MG: 20 TABLET, COATED ORAL at 08:41

## 2023-06-04 RX ADMIN — SODIUM CHLORIDE, PRESERVATIVE FREE 40 MG: 5 INJECTION INTRAVENOUS at 21:47

## 2023-06-04 RX ADMIN — LISINOPRIL 20 MG: 20 TABLET ORAL at 15:35

## 2023-06-04 RX ADMIN — SODIUM CHLORIDE, PRESERVATIVE FREE 40 MG: 5 INJECTION INTRAVENOUS at 08:41

## 2023-06-04 ASSESSMENT — PAIN SCALES - GENERAL
PAINLEVEL_OUTOF10: 4
PAINLEVEL_OUTOF10: 7
PAINLEVEL_OUTOF10: 5
PAINLEVEL_OUTOF10: 5
PAINLEVEL_OUTOF10: 6

## 2023-06-04 ASSESSMENT — PAIN DESCRIPTION - LOCATION
LOCATION: ABDOMEN

## 2023-06-04 ASSESSMENT — PAIN - FUNCTIONAL ASSESSMENT
PAIN_FUNCTIONAL_ASSESSMENT: ACTIVITIES ARE NOT PREVENTED
PAIN_FUNCTIONAL_ASSESSMENT: ACTIVITIES ARE NOT PREVENTED

## 2023-06-04 ASSESSMENT — PAIN DESCRIPTION - DESCRIPTORS
DESCRIPTORS: ACHING
DESCRIPTORS: SHARP
DESCRIPTORS: ACHING

## 2023-06-05 ENCOUNTER — APPOINTMENT (OUTPATIENT)
Facility: HOSPITAL | Age: 58
End: 2023-06-05
Payer: COMMERCIAL

## 2023-06-05 LAB
ANION GAP SERPL CALC-SCNC: 7 MMOL/L (ref 3–18)
BASOPHILS # BLD: 0 K/UL (ref 0–0.1)
BASOPHILS NFR BLD: 1 % (ref 0–2)
BUN SERPL-MCNC: 13 MG/DL (ref 7–18)
BUN/CREAT SERPL: 14 (ref 12–20)
CALCIUM SERPL-MCNC: 8.8 MG/DL (ref 8.5–10.1)
CHLORIDE SERPL-SCNC: 110 MMOL/L (ref 100–111)
CO2 SERPL-SCNC: 21 MMOL/L (ref 21–32)
CREAT SERPL-MCNC: 0.94 MG/DL (ref 0.6–1.3)
DIFFERENTIAL METHOD BLD: ABNORMAL
EOSINOPHIL # BLD: 0.2 K/UL (ref 0–0.4)
EOSINOPHIL NFR BLD: 4 % (ref 0–5)
ERYTHROCYTE [DISTWIDTH] IN BLOOD BY AUTOMATED COUNT: 15.5 % (ref 11.6–14.5)
GLUCOSE SERPL-MCNC: 66 MG/DL (ref 74–99)
HCT VFR BLD AUTO: 35.7 % (ref 36–48)
HCT VFR BLD AUTO: 37.2 % (ref 36–48)
HCT VFR BLD AUTO: 39.5 % (ref 36–48)
HCT VFR BLD AUTO: 46.2 % (ref 36–48)
HGB BLD-MCNC: 11.2 G/DL (ref 13–16)
HGB BLD-MCNC: 12 G/DL (ref 13–16)
HGB BLD-MCNC: 12.6 G/DL (ref 13–16)
HGB BLD-MCNC: 14.6 G/DL (ref 13–16)
IMM GRANULOCYTES # BLD AUTO: 0 K/UL (ref 0–0.04)
IMM GRANULOCYTES NFR BLD AUTO: 0 % (ref 0–0.5)
LYMPHOCYTES # BLD: 1.3 K/UL (ref 0.9–3.6)
LYMPHOCYTES NFR BLD: 19 % (ref 21–52)
MCH RBC QN AUTO: 23.8 PG (ref 24–34)
MCHC RBC AUTO-ENTMCNC: 31.4 G/DL (ref 31–37)
MCV RBC AUTO: 76 FL (ref 78–100)
MONOCYTES # BLD: 0.7 K/UL (ref 0.05–1.2)
MONOCYTES NFR BLD: 10 % (ref 3–10)
NEUTS SEG # BLD: 4.2 K/UL (ref 1.8–8)
NEUTS SEG NFR BLD: 66 % (ref 40–73)
NRBC # BLD: 0 K/UL (ref 0–0.01)
NRBC BLD-RTO: 0 PER 100 WBC
PLATELET # BLD AUTO: 272 K/UL (ref 135–420)
PMV BLD AUTO: 9 FL (ref 9.2–11.8)
POTASSIUM SERPL-SCNC: 3.6 MMOL/L (ref 3.5–5.5)
RBC # BLD AUTO: 4.7 M/UL (ref 4.35–5.65)
SODIUM SERPL-SCNC: 138 MMOL/L (ref 136–145)
WBC # BLD AUTO: 6.4 K/UL (ref 4.6–13.2)

## 2023-06-05 PROCEDURE — 99232 SBSQ HOSP IP/OBS MODERATE 35: CPT | Performed by: SURGERY

## 2023-06-05 PROCEDURE — 6360000002 HC RX W HCPCS

## 2023-06-05 PROCEDURE — 2580000003 HC RX 258

## 2023-06-05 PROCEDURE — 6370000000 HC RX 637 (ALT 250 FOR IP)

## 2023-06-05 PROCEDURE — A4216 STERILE WATER/SALINE, 10 ML: HCPCS

## 2023-06-05 PROCEDURE — 6360000002 HC RX W HCPCS: Performed by: INTERNAL MEDICINE

## 2023-06-05 PROCEDURE — 85025 COMPLETE CBC W/AUTO DIFF WBC: CPT

## 2023-06-05 PROCEDURE — 85018 HEMOGLOBIN: CPT

## 2023-06-05 PROCEDURE — 80048 BASIC METABOLIC PNL TOTAL CA: CPT

## 2023-06-05 PROCEDURE — 36415 COLL VENOUS BLD VENIPUNCTURE: CPT

## 2023-06-05 PROCEDURE — 74250 X-RAY XM SM INT 1CNTRST STD: CPT

## 2023-06-05 PROCEDURE — 1100000000 HC RM PRIVATE

## 2023-06-05 PROCEDURE — C9113 INJ PANTOPRAZOLE SODIUM, VIA: HCPCS

## 2023-06-05 PROCEDURE — 85014 HEMATOCRIT: CPT

## 2023-06-05 PROCEDURE — 99232 SBSQ HOSP IP/OBS MODERATE 35: CPT | Performed by: FAMILY MEDICINE

## 2023-06-05 RX ADMIN — ACETAMINOPHEN 325MG 650 MG: 325 TABLET ORAL at 21:12

## 2023-06-05 RX ADMIN — SODIUM CHLORIDE, PRESERVATIVE FREE 10 ML: 5 INJECTION INTRAVENOUS at 21:08

## 2023-06-05 RX ADMIN — SODIUM CHLORIDE, PRESERVATIVE FREE 5 ML: 5 INJECTION INTRAVENOUS at 13:00

## 2023-06-05 RX ADMIN — SODIUM CHLORIDE, PRESERVATIVE FREE 40 MG: 5 INJECTION INTRAVENOUS at 21:08

## 2023-06-05 RX ADMIN — HYDROMORPHONE HYDROCHLORIDE 0.5 MG: 1 INJECTION, SOLUTION INTRAMUSCULAR; INTRAVENOUS; SUBCUTANEOUS at 11:58

## 2023-06-05 ASSESSMENT — PAIN DESCRIPTION - LOCATION
LOCATION: ABDOMEN
LOCATION: ABDOMEN

## 2023-06-05 ASSESSMENT — PAIN SCALES - GENERAL
PAINLEVEL_OUTOF10: 8
PAINLEVEL_OUTOF10: 3
PAINLEVEL_OUTOF10: 3
PAINLEVEL_OUTOF10: 4

## 2023-06-05 ASSESSMENT — PAIN DESCRIPTION - DESCRIPTORS: DESCRIPTORS: ACHING

## 2023-06-05 ASSESSMENT — PAIN - FUNCTIONAL ASSESSMENT: PAIN_FUNCTIONAL_ASSESSMENT: ACTIVITIES ARE NOT PREVENTED

## 2023-06-06 LAB
HCT VFR BLD AUTO: 36.5 % (ref 36–48)
HCT VFR BLD AUTO: 36.8 % (ref 36–48)
HCT VFR BLD AUTO: 39.3 % (ref 36–48)
HGB BLD-MCNC: 11.5 G/DL (ref 13–16)
HGB BLD-MCNC: 11.6 G/DL (ref 13–16)
HGB BLD-MCNC: 12.5 G/DL (ref 13–16)

## 2023-06-06 PROCEDURE — 1100000000 HC RM PRIVATE

## 2023-06-06 PROCEDURE — 36415 COLL VENOUS BLD VENIPUNCTURE: CPT

## 2023-06-06 PROCEDURE — 6370000000 HC RX 637 (ALT 250 FOR IP)

## 2023-06-06 PROCEDURE — 97116 GAIT TRAINING THERAPY: CPT

## 2023-06-06 PROCEDURE — 85014 HEMATOCRIT: CPT

## 2023-06-06 PROCEDURE — 99232 SBSQ HOSP IP/OBS MODERATE 35: CPT | Performed by: SURGERY

## 2023-06-06 PROCEDURE — 94761 N-INVAS EAR/PLS OXIMETRY MLT: CPT

## 2023-06-06 PROCEDURE — 6370000000 HC RX 637 (ALT 250 FOR IP): Performed by: FAMILY MEDICINE

## 2023-06-06 PROCEDURE — 99232 SBSQ HOSP IP/OBS MODERATE 35: CPT | Performed by: FAMILY MEDICINE

## 2023-06-06 PROCEDURE — A4216 STERILE WATER/SALINE, 10 ML: HCPCS

## 2023-06-06 PROCEDURE — 97535 SELF CARE MNGMENT TRAINING: CPT

## 2023-06-06 PROCEDURE — C9113 INJ PANTOPRAZOLE SODIUM, VIA: HCPCS

## 2023-06-06 PROCEDURE — 2580000003 HC RX 258

## 2023-06-06 PROCEDURE — 6360000002 HC RX W HCPCS

## 2023-06-06 PROCEDURE — 85018 HEMOGLOBIN: CPT

## 2023-06-06 RX ADMIN — ROSUVASTATIN CALCIUM 40 MG: 20 TABLET, COATED ORAL at 08:12

## 2023-06-06 RX ADMIN — ACETAMINOPHEN 325MG 650 MG: 325 TABLET ORAL at 08:12

## 2023-06-06 RX ADMIN — SODIUM CHLORIDE, PRESERVATIVE FREE 10 ML: 5 INJECTION INTRAVENOUS at 20:42

## 2023-06-06 RX ADMIN — SODIUM CHLORIDE, PRESERVATIVE FREE 40 MG: 5 INJECTION INTRAVENOUS at 09:32

## 2023-06-06 RX ADMIN — HYDROCHLOROTHIAZIDE 12.5 MG: 12.5 CAPSULE ORAL at 08:12

## 2023-06-06 RX ADMIN — SODIUM CHLORIDE, PRESERVATIVE FREE 40 MG: 5 INJECTION INTRAVENOUS at 20:44

## 2023-06-06 RX ADMIN — ACETAMINOPHEN 325MG 650 MG: 325 TABLET ORAL at 16:08

## 2023-06-06 RX ADMIN — ACETAMINOPHEN 325MG 650 MG: 325 TABLET ORAL at 23:03

## 2023-06-06 RX ADMIN — SODIUM CHLORIDE, PRESERVATIVE FREE 10 ML: 5 INJECTION INTRAVENOUS at 08:13

## 2023-06-06 RX ADMIN — GABAPENTIN 300 MG: 300 CAPSULE ORAL at 08:12

## 2023-06-06 RX ADMIN — LISINOPRIL 20 MG: 20 TABLET ORAL at 08:12

## 2023-06-06 ASSESSMENT — PAIN DESCRIPTION - DESCRIPTORS: DESCRIPTORS: ACHING

## 2023-06-06 ASSESSMENT — PAIN - FUNCTIONAL ASSESSMENT: PAIN_FUNCTIONAL_ASSESSMENT: ACTIVITIES ARE NOT PREVENTED

## 2023-06-06 ASSESSMENT — PAIN SCALES - GENERAL
PAINLEVEL_OUTOF10: 0
PAINLEVEL_OUTOF10: 4
PAINLEVEL_OUTOF10: 0
PAINLEVEL_OUTOF10: 3

## 2023-06-06 ASSESSMENT — PAIN DESCRIPTION - LOCATION
LOCATION: ABDOMEN
LOCATION: ABDOMEN

## 2023-06-06 NOTE — CARE COORDINATION
CM met with patient at bedside and reviewed Home Health [address]. Patient is agreeable to [id number] and signed [address] form. CM completed referral to [facility] and spoke with Sera Fitch to accept patient. PT/OT recommended shower chair. Patient declines shower chair stating he is mostly able to bathe without assistance. Patient states his [employer]  or [employer] member will transport home at discharge.      LAUREEN Dallas  Care Management

## 2023-06-07 PROCEDURE — C9113 INJ PANTOPRAZOLE SODIUM, VIA: HCPCS

## 2023-06-07 PROCEDURE — 2580000003 HC RX 258: Performed by: FAMILY MEDICINE

## 2023-06-07 PROCEDURE — 6360000002 HC RX W HCPCS

## 2023-06-07 PROCEDURE — 6370000000 HC RX 637 (ALT 250 FOR IP)

## 2023-06-07 PROCEDURE — 94761 N-INVAS EAR/PLS OXIMETRY MLT: CPT

## 2023-06-07 PROCEDURE — 6360000002 HC RX W HCPCS: Performed by: FAMILY MEDICINE

## 2023-06-07 PROCEDURE — A4216 STERILE WATER/SALINE, 10 ML: HCPCS

## 2023-06-07 PROCEDURE — 1100000000 HC RM PRIVATE

## 2023-06-07 PROCEDURE — 2580000003 HC RX 258

## 2023-06-07 PROCEDURE — 99233 SBSQ HOSP IP/OBS HIGH 50: CPT | Performed by: SURGERY

## 2023-06-07 PROCEDURE — 6370000000 HC RX 637 (ALT 250 FOR IP): Performed by: FAMILY MEDICINE

## 2023-06-07 RX ORDER — DEXTROMETHORPHAN POLISTIREX 30 MG/5ML
30 SUSPENSION ORAL EVERY 12 HOURS SCHEDULED
Status: DISCONTINUED | OUTPATIENT
Start: 2023-06-07 | End: 2023-06-08

## 2023-06-07 RX ADMIN — HYDROCHLOROTHIAZIDE 12.5 MG: 12.5 CAPSULE ORAL at 08:38

## 2023-06-07 RX ADMIN — IPRATROPIUM BROMIDE AND ALBUTEROL SULFATE 1 DOSE: .5; 2.5 SOLUTION RESPIRATORY (INHALATION) at 18:49

## 2023-06-07 RX ADMIN — SODIUM CHLORIDE, PRESERVATIVE FREE 40 MG: 5 INJECTION INTRAVENOUS at 21:20

## 2023-06-07 RX ADMIN — ACETAMINOPHEN 325MG 650 MG: 325 TABLET ORAL at 21:19

## 2023-06-07 RX ADMIN — ACETAMINOPHEN 325MG 650 MG: 325 TABLET ORAL at 08:38

## 2023-06-07 RX ADMIN — GABAPENTIN 300 MG: 300 CAPSULE ORAL at 08:38

## 2023-06-07 RX ADMIN — SODIUM CHLORIDE, PRESERVATIVE FREE 10 ML: 5 INJECTION INTRAVENOUS at 21:21

## 2023-06-07 RX ADMIN — SODIUM CHLORIDE, PRESERVATIVE FREE 10 ML: 5 INJECTION INTRAVENOUS at 08:39

## 2023-06-07 RX ADMIN — ROSUVASTATIN CALCIUM 40 MG: 20 TABLET, COATED ORAL at 08:38

## 2023-06-07 RX ADMIN — ACETAMINOPHEN 325MG 650 MG: 325 TABLET ORAL at 14:09

## 2023-06-07 RX ADMIN — LISINOPRIL 20 MG: 20 TABLET ORAL at 08:38

## 2023-06-07 RX ADMIN — CEFEPIME 30 MG: 2 INJECTION, POWDER, FOR SOLUTION INTRAVENOUS at 21:19

## 2023-06-07 RX ADMIN — SODIUM CHLORIDE, PRESERVATIVE FREE 40 MG: 5 INJECTION INTRAVENOUS at 11:15

## 2023-06-07 ASSESSMENT — PAIN DESCRIPTION - ORIENTATION: ORIENTATION: LEFT

## 2023-06-07 ASSESSMENT — PAIN SCALES - GENERAL
PAINLEVEL_OUTOF10: 6
PAINLEVEL_OUTOF10: 0

## 2023-06-07 ASSESSMENT — PAIN DESCRIPTION - DESCRIPTORS: DESCRIPTORS: ACHING

## 2023-06-07 ASSESSMENT — PAIN DESCRIPTION - LOCATION: LOCATION: ABDOMEN

## 2023-06-07 NOTE — PLAN OF CARE
Problem: Discharge Planning  Goal: Discharge to home or other facility with appropriate resources  Recent Flowsheet Documentation  Taken 6/5/2023 0800 by David Burr RN  Discharge to home or other facility with appropriate resources: Identify barriers to discharge with patient and caregiver     Problem: Pain  Goal: Verbalizes/displays adequate comfort level or baseline comfort level  6/5/2023 2119 by Jenna Allison RN  Outcome: Progressing  6/5/2023 1656 by David Burr RN  Outcome: Progressing     Problem: Safety - Adult  Goal: Free from fall injury  6/5/2023 2119 by Jenna Allison RN  Outcome: Progressing  6/5/2023 1656 by David Burr RN  Outcome: Progressing
Problem: Physical Therapy - Adult  Goal: By Discharge: Performs mobility at highest level of function for planned discharge setting. See evaluation for individualized goals. Description: Physical Therapy Goals:  Initiated 6/4/2023 to be met within 7-10 days. 1.  Patient will move from supine to sit and sit to supine  in bed with modified independence. 2.  Patient will transfer from bed to chair and chair to bed with modified independence using the least restrictive device. 3.  Patient will perform sit to stand with modified independence. 4.  Patient will ambulate with modified independence for 150 feet with the least restrictive device. 5.  Patient will ascend/descend 3 stairs with handrail(s) with modified independence. PLOF: Independent with mobility w/o AD. He lives alone in trailer single story with steps to enter. Outcome: Adequate for Discharge    PHYSICAL THERAPY TREATMENT/DISCHARGE    Patient: Georgie Rivera (89 y.o. male)  Date: 6/6/2023  Diagnosis: SBO (small bowel obstruction) ([facility]) [K56.609] SBO (small bowel obstruction) ([facility])      Precautions: Fall Risk, NPO      ASSESSMENT:  Patient received semi reclined in bed and reports feeling much better. No longer has NG tube. He has been ambulating to the restroom independently. Patient is independent with functional mobility. Steady gait ambulating around the room. Patient does not require further skilled PT services at this time and will discharge from PT caseload. PLAN:  Maximum therapeutic gains met at current level of care and patient will be discharged from physical therapy at this time.   Rationale for discharge:  [x]     Goals Achieved  []     [address]  []     Patient not participating in therapy  []     Other:    Further Equipment Recommendations for Discharge: none    [facility]:    24        At this time and based on an AM-PAC score, no further PT is recommended upon discharge due to (i.e. patient at baseline functional
Problem: Safety - Adult  Goal: Free from fall injury  Outcome: Progressing     Problem: Pain  Goal: Verbalizes/displays adequate comfort level or baseline comfort level  Outcome: Progressing
Problem: Safety - Adult  Goal: Free from fall injury  Outcome: Progressing     Problem: Pain  Goal: Verbalizes/displays adequate comfort level or baseline comfort level  Outcome: Progressing    Pt returned from diagnostic testing and one dose of IV dilaudid given with effective results. MD stated no upper GI studies needed and ok to advance diet to clear liquids. Educated pt and family on importance of starting slowly and not eating/drinking too much at once. Understanding verbalized and pt has reported no pain or nausea with oral intake. Voiding without difficulty.
independence. 2.  Patient will transfer from bed to chair and chair to bed with modified independence using the least restrictive device. 3.  Patient will perform sit to stand with modified independence. 4.  Patient will ambulate with modified independence for 150 feet with the least restrictive device. 5.  Patient will ascend/descend 3 stairs with handrail(s) with modified independence. PLOF: Independent with mobility w/o AD. He lives alone in trailer single story with steps to enter.        6/6/2023 1232 by Beck Perez PT  Outcome: Adequate for Discharge     Problem: Safety - Adult  Goal: Free from fall injury  Outcome: Progressing
seated EOB  [x]  Call bell left within reach  [x]  Nursing notified  []  Caregiver present  []  Bed alarm activated    COMMUNICATION/EDUCATION:   Patient Education  Education Given To: Patient  Education Provided: Role of Therapy;Plan of Care;ADL Adaptive Strategies; Energy Conservation;Equipment  Education Method: Verbal;Teach Back  Barriers to Learning: None  Education Outcome: Verbalized understanding;Demonstrated understanding    Thank you for this referral.  MAGEN Rosas  Minutes: 15

## 2023-06-08 PROCEDURE — 6370000000 HC RX 637 (ALT 250 FOR IP)

## 2023-06-08 PROCEDURE — 6370000000 HC RX 637 (ALT 250 FOR IP): Performed by: FAMILY MEDICINE

## 2023-06-08 PROCEDURE — 1100000000 HC RM PRIVATE

## 2023-06-08 PROCEDURE — 94761 N-INVAS EAR/PLS OXIMETRY MLT: CPT

## 2023-06-08 PROCEDURE — 99233 SBSQ HOSP IP/OBS HIGH 50: CPT | Performed by: SURGERY

## 2023-06-08 PROCEDURE — 6360000002 HC RX W HCPCS

## 2023-06-08 PROCEDURE — C9113 INJ PANTOPRAZOLE SODIUM, VIA: HCPCS

## 2023-06-08 PROCEDURE — 2580000003 HC RX 258

## 2023-06-08 RX ORDER — DEXTROMETHORPHAN POLISTIREX 30 MG/5ML
30 SUSPENSION ORAL EVERY 12 HOURS SCHEDULED
Status: DISCONTINUED | OUTPATIENT
Start: 2023-06-08 | End: 2023-06-09 | Stop reason: HOSPADM

## 2023-06-08 RX ADMIN — GABAPENTIN 300 MG: 300 CAPSULE ORAL at 09:17

## 2023-06-08 RX ADMIN — DEXTROMETHORPHAN POLISTIREX 30 MG: 30 SUSPENSION ORAL at 20:50

## 2023-06-08 RX ADMIN — SODIUM CHLORIDE, PRESERVATIVE FREE 10 ML: 5 INJECTION INTRAVENOUS at 20:50

## 2023-06-08 RX ADMIN — IPRATROPIUM BROMIDE AND ALBUTEROL SULFATE 1 DOSE: .5; 2.5 SOLUTION RESPIRATORY (INHALATION) at 05:48

## 2023-06-08 RX ADMIN — LISINOPRIL 20 MG: 20 TABLET ORAL at 09:17

## 2023-06-08 RX ADMIN — HYDROCHLOROTHIAZIDE 12.5 MG: 12.5 CAPSULE ORAL at 09:17

## 2023-06-08 RX ADMIN — SODIUM CHLORIDE, PRESERVATIVE FREE 10 ML: 5 INJECTION INTRAVENOUS at 09:19

## 2023-06-08 RX ADMIN — ACETAMINOPHEN 325MG 650 MG: 325 TABLET ORAL at 09:16

## 2023-06-08 RX ADMIN — ROSUVASTATIN CALCIUM 40 MG: 20 TABLET, COATED ORAL at 09:17

## 2023-06-08 RX ADMIN — SODIUM CHLORIDE, PRESERVATIVE FREE 40 MG: 5 INJECTION INTRAVENOUS at 09:16

## 2023-06-08 RX ADMIN — CEFEPIME 30 MG: 2 INJECTION, POWDER, FOR SOLUTION INTRAVENOUS at 09:17

## 2023-06-08 ASSESSMENT — PAIN DESCRIPTION - LOCATION: LOCATION: ABDOMEN

## 2023-06-08 NOTE — DISCHARGE INSTRUCTIONS
Discharge Instructions    Patient: Evelyn Alexander MRN: [id number]  SSN: [id number]    YOB: 1965  Age: 59 y.o. Sex: male      Activity  As tolerated    Diet  Regular diet    Appointment date/time Follow-Up Phone Calls    Call the office of Dr. Salena Hernandez at [phone] to make your follow-up appointment in 2 weeks         Measure your blood pressure at home on a regular basis and follow instructions as written on paper for you by your hospital physician. Return to the ED if you have worsening abdominal pain and distention, if you are not passing gas rectally, if you do not have bowel movements for over two days.

## 2023-06-09 ENCOUNTER — HOME HEALTH ADMISSION (OUTPATIENT)
Age: 58
End: 2023-06-09
Payer: MEDICAID

## 2023-06-09 VITALS
BODY MASS INDEX: 30.22 KG/M2 | SYSTOLIC BLOOD PRESSURE: 125 MMHG | OXYGEN SATURATION: 97 % | WEIGHT: 188 LBS | RESPIRATION RATE: 18 BRPM | HEIGHT: 66 IN | HEART RATE: 95 BPM | TEMPERATURE: 97.8 F | DIASTOLIC BLOOD PRESSURE: 77 MMHG

## 2023-06-09 PROCEDURE — 6370000000 HC RX 637 (ALT 250 FOR IP)

## 2023-06-09 PROCEDURE — 6370000000 HC RX 637 (ALT 250 FOR IP): Performed by: FAMILY MEDICINE

## 2023-06-09 PROCEDURE — 99232 SBSQ HOSP IP/OBS MODERATE 35: CPT | Performed by: SURGERY

## 2023-06-09 RX ADMIN — HYDROCHLOROTHIAZIDE 12.5 MG: 12.5 CAPSULE ORAL at 08:31

## 2023-06-09 RX ADMIN — LISINOPRIL 20 MG: 20 TABLET ORAL at 08:32

## 2023-06-09 RX ADMIN — ROSUVASTATIN CALCIUM 40 MG: 20 TABLET, COATED ORAL at 08:31

## 2023-06-09 RX ADMIN — GABAPENTIN 300 MG: 300 CAPSULE ORAL at 08:31

## 2023-06-09 RX ADMIN — DEXTROMETHORPHAN POLISTIREX 30 MG: 30 SUSPENSION ORAL at 08:32

## 2023-06-09 NOTE — HOME CARE
Home care referral received for SN PT OT. Chart reviewed. Met with to Patient Verified address and telephone numbers. Explained services ordered and agency routines. Orders noted and arranged. Discussed DME that the patient already owns or ordered. States he has cane.     Agency contact information on CALI Rousseau RN, BSN   [employer]

## 2023-06-09 NOTE — CARE COORDINATION
Discharge order noted for today. Pt has been accepted to [facility] agency. Met with patient agreeable to the transition plan today. Transport has been arranged through patient family. Patient's discharge summary and home health  orders have been forwarded to [facility] home health  agency via [employer] Updated bedside RN, Alma Rosa An,  to the transition plan.   Discharge information has been documented on the AVS.       LAUREEN Dallas  Care Management

## 2023-06-09 NOTE — DISCHARGE SUMMARY
Take 40 mg by mouth daily           STOP taking these medications       eplerenone (INSPRA) 25 MG tablet Comments:   Reason for Stopping:         indapamide (LOZOL) 2.5 MG tablet Comments:   Reason for Stopping:         lisinopril-hydroCHLOROthiazide (PRINZIDE;ZESTORETIC) 20-12.5 MG per tablet Comments:   Reason for Stopping: Follow-up Appointments:   1. Your PCP: SACHI Mathews NP, within 7-10days  2.          Please follow-up on tests/labs that are still pendin.     >30 minutes spent coordinating this discharge (review instructions/follow-up, prescriptions, preparing report for sign off)    Signed:  Bianca Schroeder MD  2023  1:02 PM

## 2023-06-09 NOTE — CARE COORDINATION
CM contacted Teodoro Cam [phone] to schedule patient a ride home after he was unable to secure a family member. Trip # [id number]. Transport will contact nurse station when arrive. UPDATE:  3:10 PM Patient family member arrived and transported ride home. CM cancelled transport.        LAUREEN Dallas  Care Management

## 2023-06-10 NOTE — PROGRESS NOTES
2019 Patient in bed AAOx4, complaining 6/10 lower abdominal pain,gave dilaudid 0.5mg IV. Patient denies nausea /vomiting, passing flatus.
Bedside and Verbal shift change report given to Magdalene Simon RN (oncoming nurse) by Cristina Tirado RN (offgoing nurse). Report included the following information Nurse Handoff Report, MAR, and Recent Results.
Comprehensive Nutrition Assessment    Type and Reason for Visit:  Initial, RD Nutrition Re-Screen/LOS    Nutrition Recommendations/Plan:   Continue Regular Diet. Continue to monitor tolerance of PO, weight, labs, and plan of care during admission. Malnutrition Assessment:  Malnutrition Status:  No malnutrition (06/09/23 1245)      Nutrition History and Allergies: PMHx: COPD, Hypertension, Hyperlipidemia. Pt reports decreased appetite x 2-3 days due to GI issues. Pt reports UBW of 195 lbs, unsure of timeframe. Pt reports recent weight gain due to fluid accumulation in abdomen- otherwise weight stable. Wt hx: 188 lb (06/09/23), 178 lb (04/24/23) +5.6% x ~2 months. NKFA. Nutrition Assessment:   Pt presents with complaints of abdominal pain and hematochezia. Per H&P, Pt noted to have a decrease in appetite. Pt admitted for small bowel obstruction. LOS. Pt seen sitting in chair at bedside. Pt reports good appetite, consuming 100% of meals. . Per Flowsheets, (1) meal documented with % of meal. Per 6/9 MD notes- small bowel obstruction has resolved, pt is tolerating a regular diet and having normal bowel movements. Pt with no nutritional questions at this time. Nutrition Related Findings:    Last BM: 06/08/23. Edema: None. Pertinent Meds: Protonix, Crestor, Microzide. Pertinent labs: labs reviewed- wnl. Wound Type: None       Current Nutrition Intake & Therapies:    Average Meal Intake: %  Average Supplements Intake: None Ordered  ADULT DIET; Regular    Anthropometric Measures:  Height: 5' 6\" (167.6 cm)  Ideal Body Weight (IBW): 142 lbs (65 kg)    Admission Body Weight: 188 lb (85.3 kg)  Current Body Weight: 188 lb (85.3 kg), 132.4 % IBW. Weight Source: Bed Scale  Current BMI (kg/m2): 30.4  Usual Body Weight: 195 lb (88.5 kg)  % Weight Change (Calculated): -3.6  Weight Adjustment For: No Adjustment  BMI Categories: Obese Class 1 (BMI 30.0-34. 9)    Estimated Daily Nutrient Needs:  Energy
Discharge instructions given all questions answered
General Surgery Consult      Grayling Dance  Admit date: 2023    MRN: [id number]     : 1965     Age: 59 y.o. Attending Physician: Dany Mejia MD, FACS      Subjective:     Grayling Dance is a 59 y.o. male who we are following for a picture of small bowel obstruction. The patient was doing relatively well and he has been passing flatus and having bowel movement and denies any abdominal pain. He had a small bowel series yesterday that showed small bowel dilatation but no transition point consistent with either partial obstruction or ileus. Unfortunately after drinking the 2 bottles of contrast for the small bowel series the patient is feeling bloated. He states that he still passing flatus and having bowel movement but he feels slightly bloated compared to the day before. He actually had 1 episodes of vomiting yesterday but no nausea or vomiting all night. His vitals are normal with no fever or tachycardia. Patient Active Problem List    Diagnosis Date Noted    SBO (small bowel obstruction) ([facility] 2023    Lower GI bleed 2023    Hypertension 2023    Hyperlipidemia 2023    Cervical myelopathy ([facility] 2023    Rhabdomyolysis 2016     Past Medical History:   Diagnosis Date    Arthritis     Chronic obstructive pulmonary disease ([facility]     Hypertension       Past Surgical History:   Procedure Laterality Date    HEENT      left inguinal hernia repair    ORTHOPEDIC SURGERY      right arm      Social History     Tobacco Use    Smoking status: Former     Types: Cigarettes     Quit date: 1981     Years since quittin.4    Smokeless tobacco: Never   Substance Use Topics    Alcohol use: Not Currently     Alcohol/week: 5.0 standard drinks      Social History     Tobacco Use   Smoking Status Former    Types: Cigarettes    Quit date: 1981    Years since quittin.4   Smokeless Tobacco Never     No family history on file.    Current Facility-Administered
General Surgery Consult      Vanessa Verdugo  Admit date: 2023    MRN: [id number]     : 1965     Age: 59 y.o. Attending Physician: Saint Dill, MD, FACS      Subjective:     Vanessa Verdugo is a 59 y.o. male who we are following for evaluation of picture of small bowel obstruction that seems to have resolved. The patient stated that he feels less bloated and though he denies any abdominal pain he has been complaining of some left groin pain at the site of his previous open inguinal hernia repair. He states that this pain always comes and goes since the surgery. He denies any nausea or vomiting and has been passing flatus and bowel movement and is tolerating his full liquid diet. He is currently shows no fever or tachycardia. Patient Active Problem List    Diagnosis Date Noted    SBO (small bowel obstruction) ([facility] 2023    Lower GI bleed 2023    Hypertension 2023    Hyperlipidemia 2023    [facility] 2023    Rhabdomyolysis 2016     Past Medical History:   Diagnosis Date    Arthritis     Ch[facility]     Hypertension       Past Surgical History:   Procedure Laterality Date    HEENT      left inguinal hernia repair    ORTHOPEDIC SURGERY      right arm      Social History     Tobacco Use    Smoking status: Former     Types: Cigarettes     Quit date: 1981     Years since quittin.4    Smokeless tobacco: Never   Substance Use Topics    Alcohol use: Not Currently     Alcohol/week: 5.0 standard drinks      Social History     Tobacco Use   Smoking Status Former    Types: Cigarettes    Quit date: 1981    Years since quittin.4   Smokeless Tobacco Never     No family history on file.    Current Facility-Administered Medications   Medication Dose Route Frequency    phenol 1.4 % mouth spray 1 spray  1 spray Mouth/Throat Q2H PRN    diatrizoate meglumine-sodium (GASTROGRAFIN) 66-10 % solution 240 mL  240 mL
General Surgery Consult    Jannette Vanessa  Admit date: 2023    MRN: [id number]     : 1965     Age: 59 y.o. Attending Physician: Donita Maya MD, [facility]      History of Present Illness:      Jannette Vanessa is a 59 y.o. male who we are following for evaluation of small bowel obstruction that has resolved. Though the patient is tolerating a regular diet and passing flatus and having normal bowel movement, but he still has some abdominal discomfort especially in the left groin area. He also stated that he had some blood while passing stool most likely secondary to hemorrhoids. His vitals are normal with no fever or tachycardia. Patient Active Problem List    Diagnosis Date Noted    SBO (small bowel obstruction) ([facility] 2023    Lower GI bleed 2023    Hypertension 2023    Hyperlipidemia 2023    Cervical myelopathy ([facility] [address] 2023    Rhabdomyolysis 2016     Past Medical History:   Diagnosis Date    Arthritis     [facility]     Hypertension       Past Surgical History:   Procedure Laterality Date    HEENT      left inguinal hernia repair    ORTHOPEDIC SURGERY      right arm      Social History     Tobacco Use    Smoking status: Former     Types: Cigarettes     Quit date: 1981     Years since quittin.4    Smokeless tobacco: Never   Substance Use Topics    Alcohol use: Not Currently     Alcohol/week: 5.0 standard drinks      Social History     Tobacco Use   Smoking Status Former    Types: Cigarettes    Quit date: 1981    Years since quittin.4   Smokeless Tobacco Never     No family history on file.    Current Facility-Administered Medications   Medication Dose Route Frequency    dextromethorphan (DELSYM) 30 MG/5ML extended release liquid 30 mg  30 mg Oral 2 times per day    phenol 1.4 % mouth spray 1 spray  1 spray Mouth/Throat Q2H PRN    diatrizoate meglumine-sodium (GASTROGRAFIN) 66-10 % solution 240 mL  240 mL Oral
[facility]  Progress Note    Patient: Rosa Siegel Age: 59 y.o. : 1965 MR#: [id number] SSN: [id number]  Date: 2023       Subjective/24-hour events:     Complains of feeling a little more bloated/distended since yesterday. Denies any worsening pain or nausea or vomiting, however. Assessment:   SBO  GI bleed  Hypertension  COPD without acute exacerbation  Class I obesity, BMI 30.5    Plan:   Full liquids, monitor for continued tolerance. Diet advancement per surgery. Mobilize as much as tolerated. Continue supportive care otherwise. Follow. Case discussed with:  [x]Patient  []Family  [x] Nursing  [x]Case Management  DVT Prophylaxis:  []Lovenox  []Hep SQ  []SCDs  []Coumadin   []On Heparin gtt []PO anticoagulant    Objective:   VS: BP (!) 148/89   Pulse 83   Temp 98.2 °F (36.8 °C) (Oral)   Resp 16   Ht 5' 6\" (1.676 m)   Wt 189 lb (85.7 kg)   SpO2 97%   BMI 30.51 kg/m²      Tmax/24hrs: Temp (24hrs), Av.3 °F (36.8 °C), Min:98 °F (36.7 °C), Max:98.9 °F (37.2 °C)    Intake/Output Summary (Last 24 hours) at 2023 0945  Last data filed at 2023 0600  Gross per 24 hour   Intake 820 ml   Output 1500 ml   Net -680 ml       Gen: In NAD. NGT in place. Lungs: Clear, no wheezes  Effort nonlabored. CV: RRR. Abdomen: Soft but mildly distended. No guarding/rebound. Extremities: Warm, no pitting edema or ischemia. Neuro:  Awake and alert, moves extremities spontaneously.     Current Facility-Administered Medications   Medication Dose Route Frequency    phenol 1.4 % mouth spray 1 spray  1 spray Mouth/Throat Q2H PRN    diatrizoate meglumine-sodium (GASTROGRAFIN) 66-10 % solution 240 mL  240 mL Oral ONCE PRN    lisinopril (PRINIVIL;ZESTRIL) tablet 20 mg  20 mg Oral Daily    And    hydroCHLOROthiazide (MICROZIDE) capsule 12.5 mg  12.5 mg Oral Daily    sodium chloride flush 0.9 % injection 5-40 mL  5-40 mL IntraVENous 2 times per day    sodium
Per Dr Duglas hawkins to advance diet to clear liquids and no upper GI study necessary. Pt encouraged to go slow with oral intake and stop  eating/drinking if pain increases.
Physician Progress Note      Sriram OCAMPO #:                  [id number]  :                       1965  ADMIT DATE:       2023 4:38 PM  [address] DATE:        2023 3:11 PM  RESPONDING  PROVIDER #:        Van Richardson MD          QUERY TEXT:    Patient admitted with GI bleeding, noted to have PMH of colon polyps. If   possible, please document in progress notes and discharge summary the cause of   the GI bleeding: The medical record reflects the following:  Risk Factors: H&P: Patient reported a colonoscopy last year with a polyp   removed. No history of hemorrhoid. Clinical Indicators:  H&P:  Lower GI Bleed- hematochezia since Saturday; Stool   occult positive. Hgb stable at 13.3. Last colonoscopy in . GI consult:  [facility] -  H/H stable  Treatment: Protonix; H&H q6h; transfuse for Hgb <7; IVF; GI consult    Thank you,  Usama Banda [email or web address]  Options provided:  -- GI bleeding, suspect due to colon polyps  -- GI bleeding due to (please specify suspected etiology), please specify. -- Other - I will add my own diagnosis  -- Disagree - Not applicable / Not valid  -- Disagree - Clinically unable to determine / Unknown  -- Refer to Clinical Documentation Reviewer    PROVIDER RESPONSE TEXT:    This patient has GI bleeding, suspext due to colon polyps.     Query created by: Raad Vidal on 2023 1:48 PM      Electronically signed by:  Van Richardson MD 6/10/2023 1:51 PM
[facility]  Progress Note    Patient: Madhu Camejo Age: 59 y.o. : 1965 MR#: [id number] SSN: [id number]  Date: 2023       Subjective/24-hour events:     SB study completed, patient feeling significantly better. Has had large BM and is tolerating clears w/o difficulty. NGT out. Assessment:   SBO  GI bleed  Hypertension  COPD without acute exacerbation  Class I obesity, BMI 30.5    Plan:   Diet advancement per surgery - clears continued for now. IV narcotic already discontinued by surgery. Agree,. Mobilize as tolerated. Home soon if continues to improve. Case discussed with:  [x]Patient  []Family  [x] Nursing  [x]Case Management  DVT Prophylaxis:  []Lovenox  []Hep SQ  []SCDs  []Coumadin   []On Heparin gtt []PO anticoagulant    Objective:   VS: BP (!) 156/98   Pulse 92   Temp 98 °F (36.7 °C) (Oral)   Resp 22   Ht 5' 6\" (1.676 m)   Wt 189 lb (85.7 kg)   SpO2 95%   BMI 30.51 kg/m²      Tmax/24hrs: Temp (24hrs), Av.9 °F (36.6 °C), Min:97.3 °F (36.3 °C), Max:98.1 °F (36.7 °C)    Intake/Output Summary (Last 24 hours) at 2023 1528  Last data filed at 2023 0800  Gross per 24 hour   Intake 825 ml   Output 775 ml   Net 50 ml       Gen: In NAD. NGT in place. Lungs: Clear, no wheezes  Effort nonlabored. CV: RRR. Abdomen: Soft, nondistended. Extremities: Warm, no pitting edema or ischemia. Neuro:  Awake and alert, moves extremities spontaneously.     Current Facility-Administered Medications   Medication Dose Route Frequency    diatrizoate meglumine-sodium (GASTROGRAFIN) 66-10 % solution 240 mL  240 mL Oral ONCE PRN    lisinopril (PRINIVIL;ZESTRIL) tablet 20 mg  20 mg Oral Daily    And    hydroCHLOROthiazide (MICROZIDE) capsule 12.5 mg  12.5 mg Oral Daily    sodium chloride flush 0.9 % injection 5-40 mL  5-40 mL IntraVENous 2 times per day    sodium chloride flush 0.9 % injection 5-40 mL  5-40 mL IntraVENous PRN    ondansetron (ZOFRAN-ODT)
IntraVENous PRN    ondansetron (ZOFRAN-ODT) disintegrating tablet 4 mg  4 mg Oral Q8H PRN    Or    ondansetron (ZOFRAN) injection 4 mg  4 mg IntraVENous Q6H PRN    acetaminophen (TYLENOL) tablet 650 mg  650 mg Oral Q6H PRN    Or    acetaminophen (TYLENOL) suppository 650 mg  650 mg Rectal Q6H PRN    pantoprazole (PROTONIX) 40 mg in sodium chloride (PF) 0.9 % 10 mL injection  40 mg IntraVENous Q12H    gabapentin (NEURONTIN) capsule 300 mg  300 mg Oral Daily    rosuvastatin (CRESTOR) tablet 40 mg  40 mg Oral Daily    ipratropium 0.5 mg-albuterol 2.5 mg (DUONEB) nebulizer solution 1 Dose  1 Dose Inhalation Q4H PRN        Labs:    Recent Results (from the past 24 hour(s))   Hemoglobin and Hematocrit    Collection Time: 06/06/23  2:23 PM   Result Value Ref Range    Hemoglobin 11.6 (L) 13.0 - 16.0 g/dL    Hematocrit 36.8 36.0 - 48.0 %         Signed By: Wale Malone MD     June 7, 2023
chloride flush 0.9 % injection 5-40 mL  5-40 mL IntraVENous PRN    ondansetron (ZOFRAN-ODT) disintegrating tablet 4 mg  4 mg Oral Q8H PRN    Or    ondansetron (ZOFRAN) injection 4 mg  4 mg IntraVENous Q6H PRN    polyethylene glycol (GLYCOLAX) packet 17 g  17 g Oral Daily PRN    acetaminophen (TYLENOL) tablet 650 mg  650 mg Oral Q6H PRN    Or    acetaminophen (TYLENOL) suppository 650 mg  650 mg Rectal Q6H PRN    pantoprazole (PROTONIX) 40 mg in sodium chloride (PF) 0.9 % 10 mL injection  40 mg IntraVENous Q12H    gabapentin (NEURONTIN) capsule 300 mg  300 mg Oral Daily    rosuvastatin (CRESTOR) tablet 40 mg  40 mg Oral Daily    ipratropium 0.5 mg-albuterol 2.5 mg (DUONEB) nebulizer solution 1 Dose  1 Dose Inhalation Q4H PRN      No Known Allergies     Review of Systems:  Pertinent items are noted in the History of Present Illness.       Objective:     BP (!) 156/98   Pulse 92   Temp 98 °F (36.7 °C) (Oral)   Resp 22   Ht 5' 6\" (1.676 m)   Wt 189 lb (85.7 kg)   SpO2 95%   BMI 30.51 kg/m²     Physical Exam:      General:  in no apparent distress, alert, oriented times 3, afebrile, and normal vitals   Eyes:  conjunctivae and sclerae normal, pupils equal, round, reactive to light   Throat & Neck: no erythema or exudates noted or neck supple and symmetrical; no palpable masses   Lungs:   clear to auscultation bilaterally   Heart:  Regular rate and rhythm   Abdomen:   rounded, soft, nontender, nondistended, no masses or organomegaly   Extremities: extremities normal, atraumatic, no cyanosis or edema   Skin: Normal.         Imaging and Lab Review:     CBC:   Lab Results   Component Value Date/Time    WBC 6.4 06/05/2023 03:19 AM    RBC 4.70 06/05/2023 03:19 AM    HGB 14.6 06/05/2023 11:50 AM    HCT 46.2 06/05/2023 11:50 AM     06/05/2023 03:19 AM     BMP:   Lab Results   Component Value Date/Time     06/05/2023 03:19 AM    K 3.6 06/05/2023 03:19 AM     06/05/2023 03:19 AM    CO2 21 06/05/2023 03:19
flush 0.9 % injection 5-40 mL  5-40 mL IntraVENous PRN    ondansetron (ZOFRAN-ODT) disintegrating tablet 4 mg  4 mg Oral Q8H PRN    Or    ondansetron (ZOFRAN) injection 4 mg  4 mg IntraVENous Q6H PRN    acetaminophen (TYLENOL) tablet 650 mg  650 mg Oral Q6H PRN    Or    acetaminophen (TYLENOL) suppository 650 mg  650 mg Rectal Q6H PRN    pantoprazole (PROTONIX) 40 mg in sodium chloride (PF) 0.9 % 10 mL injection  40 mg IntraVENous Q12H    gabapentin (NEURONTIN) capsule 300 mg  300 mg Oral Daily    rosuvastatin (CRESTOR) tablet 40 mg  40 mg Oral Daily    ipratropium 0.5 mg-albuterol 2.5 mg (DUONEB) nebulizer solution 1 Dose  1 Dose Inhalation Q4H PRN        Labs:    No results found for this or any previous visit (from the past 24 hour(s)).         Signed By: Yany Qureshi MD     June 8, 2023
03:19 AM     CMP:  Lab Results   Component Value Date/Time     06/05/2023 03:19 AM    K 3.6 06/05/2023 03:19 AM     06/05/2023 03:19 AM    CO2 21 06/05/2023 03:19 AM    BUN 13 06/05/2023 03:19 AM    GLOB 4.5 06/02/2023 05:05 PM       No results found for this or any previous visit (from the past 24 hour(s)). images and reports reviewed    Assessment:   Eleonora Harrington is a 59 y.o. male who presented with a picture of small bowel obstruction that resolved medically. The patient is doing very well and is tolerating a regular diet and having normal bowel movements. He can be discharged today if okay with the primary team.     Plan:     Regular diet  Ambulation  Discharge planning  Follow-up with me in 2 to 3 weeks.   I wrote my discharge instructions  I will sign off for now    Please call me if you have any questions (cell phone: [phone])     Signed By: Ronda Lakhani MD     June 9, 2023

## 2023-06-22 ENCOUNTER — HOME CARE VISIT (OUTPATIENT)
Age: 58
End: 2023-06-22
Payer: MEDICAID

## 2023-06-24 ENCOUNTER — HOME CARE VISIT (OUTPATIENT)
Age: 58
End: 2023-06-24
Payer: MEDICAID

## 2023-06-29 ENCOUNTER — HOME CARE VISIT (OUTPATIENT)
Age: 58
End: 2023-06-29
Payer: MEDICAID

## 2023-07-06 ENCOUNTER — HOME CARE VISIT (OUTPATIENT)
Age: 58
End: 2023-07-06
Payer: MEDICAID

## 2023-07-08 ENCOUNTER — HOME CARE VISIT (OUTPATIENT)
Age: 58
End: 2023-07-08
Payer: MEDICAID

## 2023-07-10 ENCOUNTER — HOME CARE VISIT (OUTPATIENT)
Age: 58
End: 2023-07-10
Payer: MEDICAID

## 2023-07-10 PROCEDURE — G0152 HHCP-SERV OF OT,EA 15 MIN: HCPCS

## 2023-07-11 VITALS
OXYGEN SATURATION: 97 % | TEMPERATURE: 98 F | RESPIRATION RATE: 16 BRPM | DIASTOLIC BLOOD PRESSURE: 88 MMHG | SYSTOLIC BLOOD PRESSURE: 156 MMHG | HEART RATE: 81 BPM

## 2023-07-11 ASSESSMENT — ENCOUNTER SYMPTOMS: PAIN LOCATION - PAIN QUALITY: CRAMP

## 2023-07-11 NOTE — HOME HEALTH
Caregiver involvement: Katie Alvarez (friend and POA) checks on pt and provides emotional support    Medications reviewed and all medications are available in the home this visit. The following education was provided regarding medications, medication interactions, and look alike medications (specify): Continue as directed by MD.    Medications  are effective at this time. Patient education provided this visit: see ADL note    Sharps education provided:  na    Patient level of understanding of education provided: see ADL note    Skilled Care Performed this visit: Completed OT evaluation and assessment for safety with ADL and mobility. Patient response to procedure performed:  Mr. Yareli Jimenez had a positive response to therapy today. Denies dizziness or falls. Patient's Progress towards personal goals: Mr. Yareli Jimenez has good rehab potential and has returned to his PLOF with ADL tasks    Home exercise program: na    Continued need for the following skills: SN    Discharge Plans:  1w1 with plans to discharge to self. No further need for skilled OT services. List of Comorbidities: COPD  HTN              Inpatient Notes         68-year-old male with past history of COPD, hypertension who presented with abdominal pain and found to have small bowel obstruction on imaging. Patient reported a colonoscopy last year with a polyp removed. No other abdominal prior surgery. Additionally was found to have positive occult blood. No history of hemorrhoid. patient had a bowel movement after NG tube placement which was nonbloody improved abdominal distention and pain since then surgery was consulted continue current management no surgical intervention at this point.               BELA Wells

## 2023-07-12 NOTE — CASE COMMUNICATION
Occupational Therapy Evaluation    1w1    Mr. Sara Silverman is ambulating in his [facility] unassisted today and without use of an AD. He is requesting his home health visits be performed in the [facility] where he stays during the weekdays. Unable to assess safety with tub transfers due to not being in his home. He denies needing help getting in or out of his shower and states that he is performing his own bathing. He was able to perform transfers  to the standard toilet in the [facility] bathroom. He states that he feels unsteady at times. Suggested using handicap stall with grab bar to reduce risk of falls and consider purchase of a shower seat for bathing. Pt states that he is supposed to use the cane with ambulation but left it at home today. Encouraged pt to use SC for improved balance. Pt agreeable. Mr. Sara Silverman denies needing help with ADL to include grooming, bathing, dressi ng and toileting. He does report needing increased time to don socks due to abdominal discomfort. Discussed role of OT to maximize ADL and safety in the home setting. Pt is requesting visits only be performed in the [facility] and is independent with all daily tasks while there. No further skilled OT is indicated at this time.

## 2024-03-05 ENCOUNTER — APPOINTMENT (OUTPATIENT)
Facility: HOSPITAL | Age: 59
End: 2024-03-05
Payer: MEDICAID

## 2024-03-05 ENCOUNTER — HOSPITAL ENCOUNTER (EMERGENCY)
Facility: HOSPITAL | Age: 59
Discharge: HOME | End: 2024-03-05
Payer: MEDICAID

## 2024-03-05 VITALS
OXYGEN SATURATION: 99 % | HEART RATE: 81 BPM | DIASTOLIC BLOOD PRESSURE: 89 MMHG | HEIGHT: 66 IN | WEIGHT: 190 LBS | RESPIRATION RATE: 16 BRPM | BODY MASS INDEX: 30.53 KG/M2 | SYSTOLIC BLOOD PRESSURE: 163 MMHG | TEMPERATURE: 97.7 F

## 2024-03-05 DIAGNOSIS — M41.80 DEXTROSCOLIOSIS: ICD-10-CM

## 2024-03-05 DIAGNOSIS — I10 ESSENTIAL HYPERTENSION: ICD-10-CM

## 2024-03-05 DIAGNOSIS — M54.9 ACUTE MIDLINE BACK PAIN, UNSPECIFIED BACK LOCATION: Primary | ICD-10-CM

## 2024-03-05 PROCEDURE — 6370000000 HC RX 637 (ALT 250 FOR IP): Performed by: PHYSICIAN ASSISTANT

## 2024-03-05 PROCEDURE — 72100 X-RAY EXAM L-S SPINE 2/3 VWS: CPT

## 2024-03-05 PROCEDURE — 72070 X-RAY EXAM THORAC SPINE 2VWS: CPT

## 2024-03-05 PROCEDURE — 99283 EMERGENCY DEPT VISIT LOW MDM: CPT

## 2024-03-05 RX ORDER — HYDROCHLOROTHIAZIDE 25 MG/1
12.5 TABLET ORAL ONCE
Status: COMPLETED | OUTPATIENT
Start: 2024-03-05 | End: 2024-03-05

## 2024-03-05 RX ORDER — HYDROCHLOROTHIAZIDE 25 MG/1
12.5 TABLET ORAL DAILY
Status: DISCONTINUED | OUTPATIENT
Start: 2024-03-05 | End: 2024-03-05

## 2024-03-05 RX ORDER — CYCLOBENZAPRINE HCL 5 MG
5 TABLET ORAL 2 TIMES DAILY PRN
Qty: 10 TABLET | Refills: 0 | Status: SHIPPED | OUTPATIENT
Start: 2024-03-05 | End: 2024-03-15

## 2024-03-05 RX ORDER — ACETAMINOPHEN 325 MG/1
650 TABLET ORAL
Status: COMPLETED | OUTPATIENT
Start: 2024-03-05 | End: 2024-03-05

## 2024-03-05 RX ORDER — LISINOPRIL 20 MG/1
20 TABLET ORAL ONCE
Status: COMPLETED | OUTPATIENT
Start: 2024-03-05 | End: 2024-03-05

## 2024-03-05 RX ORDER — LISINOPRIL AND HYDROCHLOROTHIAZIDE 20; 12.5 MG/1; MG/1
1 TABLET ORAL
Status: DISCONTINUED | OUTPATIENT
Start: 2024-03-05 | End: 2024-03-05

## 2024-03-05 RX ORDER — LISINOPRIL AND HYDROCHLOROTHIAZIDE 20; 12.5 MG/1; MG/1
1 TABLET ORAL DAILY
Qty: 30 TABLET | Refills: 5 | Status: SHIPPED | OUTPATIENT
Start: 2024-03-05

## 2024-03-05 RX ORDER — LISINOPRIL 20 MG/1
20 TABLET ORAL DAILY
Status: DISCONTINUED | OUTPATIENT
Start: 2024-03-05 | End: 2024-03-05

## 2024-03-05 RX ORDER — ACETAMINOPHEN 500 MG
1000 TABLET ORAL EVERY 6 HOURS PRN
Qty: 40 TABLET | Refills: 0 | Status: SHIPPED | OUTPATIENT
Start: 2024-03-05 | End: 2024-03-11

## 2024-03-05 RX ADMIN — LISINOPRIL 20 MG: 20 TABLET ORAL at 09:45

## 2024-03-05 RX ADMIN — HYDROCHLOROTHIAZIDE 12.5 MG: 25 TABLET ORAL at 09:45

## 2024-03-05 RX ADMIN — ACETAMINOPHEN 325MG 650 MG: 325 TABLET ORAL at 09:02

## 2024-03-05 ASSESSMENT — PAIN SCALES - GENERAL
PAINLEVEL_OUTOF10: 9
PAINLEVEL_OUTOF10: 9

## 2024-03-05 ASSESSMENT — PAIN DESCRIPTION - LOCATION: LOCATION: BACK

## 2024-03-05 ASSESSMENT — PAIN - FUNCTIONAL ASSESSMENT: PAIN_FUNCTIONAL_ASSESSMENT: 0-10

## 2024-03-05 NOTE — ED TRIAGE NOTES
Client reports having herniated discs in lower back, unable to make follow up appointment. Reports refill of bp meds. Client reports being recently released from assisted and needs treatment.  Having intermitten sharp Pain 9/10.

## 2024-03-05 NOTE — ED PROVIDER NOTES
EMERGENCY DEPARTMENT HISTORY AND PHYSICAL EXAM      Patient Name: Jorge Feng  MRN: [id number]  YOB: 1965  Provider: MIKE Simmons  PCP: Karla Pedro APRN - NP   Time/Date of evaluation: 9:06 AM EST on 3/5/24    History of Presenting Illness     Chief Complaint   Patient presents with    Back Pain    Medication Refill       History Provided By: Patient     History (Narative):   Jorge Feng is a 58 y.o. male with a past medical history of arthritis, hypertension, COPD presents to the ED complaining of back pain intermittently over the past several years.  He notes having a flare at this time.  States that he knows he has herniated disc, never actually had surgery for this.  He also states he has been out of his blood pressure medication, cannot recall the name of it.  He denies any fever, chills, numbness or weakness, drug or alcohol use, bowel or bladder function loss, saddle paresthesias, other symptoms.      Past History     Past Medical History:  Past Medical History:   Diagnosis Date    Arthritis     Chronic obstructive pulmonary disease (HCC)     Hypertension        Past Surgical History:  Past Surgical History:   Procedure Laterality Date    HEENT      left inguinal hernia repair    ORTHOPEDIC SURGERY      right arm       Family History:  No family history on file.    Social History:  Social History     Tobacco Use    Smoking status: Former     Current packs/day: 0.00     Types: Cigarettes     Quit date: 1981     Years since quittin.2    Smokeless tobacco: Never   Substance Use Topics    Alcohol use: Not Currently     Alcohol/week: 5.0 standard drinks of alcohol    Drug use: No       Medications:  No current facility-administered medications for this encounter.     Current Outpatient Medications   Medication Sig Dispense Refill    lisinopril-hydroCHLOROthiazide (PRINZIDE;ZESTORETIC) 20-12.5 MG per tablet Take 1 tablet by mouth daily 30 tablet 5    acetaminophen (TYLENOL) 500

## 2024-07-15 ENCOUNTER — HOSPITAL ENCOUNTER (EMERGENCY)
Facility: HOSPITAL | Age: 59
Discharge: HOME | End: 2024-07-15
Payer: MEDICAID

## 2024-07-15 VITALS
HEART RATE: 93 BPM | WEIGHT: 192 LBS | BODY MASS INDEX: 30.86 KG/M2 | TEMPERATURE: 98.1 F | DIASTOLIC BLOOD PRESSURE: 70 MMHG | SYSTOLIC BLOOD PRESSURE: 122 MMHG | HEIGHT: 66 IN | OXYGEN SATURATION: 98 % | RESPIRATION RATE: 18 BRPM

## 2024-07-15 DIAGNOSIS — M54.32 BILATERAL SCIATICA: Primary | ICD-10-CM

## 2024-07-15 DIAGNOSIS — M54.31 BILATERAL SCIATICA: Primary | ICD-10-CM

## 2024-07-15 PROCEDURE — 99283 EMERGENCY DEPT VISIT LOW MDM: CPT

## 2024-07-15 PROCEDURE — 6370000000 HC RX 637 (ALT 250 FOR IP): Performed by: PHYSICIAN ASSISTANT

## 2024-07-15 RX ORDER — METHYLPREDNISOLONE 4 MG/1
TABLET ORAL
Qty: 1 KIT | Refills: 0 | Status: SHIPPED | OUTPATIENT
Start: 2024-07-15 | End: 2024-07-21

## 2024-07-15 RX ORDER — LIDOCAINE 4 G/G
1 PATCH TOPICAL DAILY
Qty: 30 EACH | Refills: 0 | Status: SHIPPED | OUTPATIENT
Start: 2024-07-15

## 2024-07-15 RX ORDER — HYDROCODONE BITARTRATE AND ACETAMINOPHEN 5; 325 MG/1; MG/1
1 TABLET ORAL
Status: COMPLETED | OUTPATIENT
Start: 2024-07-15 | End: 2024-07-15

## 2024-07-15 RX ADMIN — HYDROCODONE BITARTRATE AND ACETAMINOPHEN 1 TABLET: 5; 325 TABLET ORAL at 18:52

## 2024-07-15 ASSESSMENT — PAIN - FUNCTIONAL ASSESSMENT: PAIN_FUNCTIONAL_ASSESSMENT: 0-10

## 2024-07-15 ASSESSMENT — PAIN DESCRIPTION - LOCATION: LOCATION: LEG

## 2024-07-15 ASSESSMENT — PAIN DESCRIPTION - ORIENTATION: ORIENTATION: LEFT;RIGHT

## 2024-07-15 ASSESSMENT — PAIN SCALES - GENERAL: PAINLEVEL_OUTOF10: 9

## 2024-07-15 NOTE — ED TRIAGE NOTES
Pt states has a hernia to left groin. States having leg pain bilaterally that started today. States has damaged nerves.

## 2024-07-16 ASSESSMENT — ENCOUNTER SYMPTOMS
DIARRHEA: 0
COLOR CHANGE: 0
VOMITING: 0
RHINORRHEA: 0
SHORTNESS OF BREATH: 0
ABDOMINAL PAIN: 0

## 2024-07-16 NOTE — ED PROVIDER NOTES
neurovascularly intact.  Denies recent trauma or injury.  No imaging ordered due to lack of blunt trauma or injury.  Suspect likely sciatica will treat patient symptomatically.  No concerning red flag symptoms.  Given outpatient Ortho follow-up and given strict return precautions. At time of discharge, pt non-toxic appearing in NAD. Pt stable for prompt outpatient follow-up with PCP 1 to 2 days.  Patient given strict instructions to return if symptoms worsen.        ED Course:          Procedures:  Procedures    Documentation/Prior Results Review:  Old medical records.  Nursing notes.        Diagnosis and Disposition     CLINICAL IMPRESSION:  1. Bilateral sciatica         Medication List        START taking these medications      lidocaine 4 % external patch  Commonly known as: HM Lidocaine Patch  Place 1 patch onto the skin daily     methylPREDNISolone 4 MG tablet  Commonly known as: MEDROL (DINO)  Take by mouth.            ASK your doctor about these medications      acetaminophen 500 MG tablet  Commonly known as: TYLENOL  Take 2 tablets by mouth every 6 hours as needed for Pain     * albuterol sulfate  (90 Base) MCG/ACT inhaler  Commonly known as: PROVENTIL;VENTOLIN;PROAIR     * albuterol sulfate  (90 Base) MCG/ACT inhaler  Commonly known as: PROVENTIL;VENTOLIN;PROAIR     aspirin 81 MG chewable tablet     DULoxetine 60 MG extended release capsule  Commonly known as: CYMBALTA     * gabapentin 600 MG tablet  Commonly known as: NEURONTIN     * gabapentin 300 MG capsule  Commonly known as: NEURONTIN     lisinopril-hydroCHLOROthiazide 20-12.5 MG per tablet  Commonly known as: PRINZIDE;ZESTORETIC  Take 1 tablet by mouth daily     PENTOXIFYLLINE ER PO     rosuvastatin 40 MG tablet  Commonly known as: CRESTOR           * This list has 4 medication(s) that are the same as other medications prescribed for you. Read the directions carefully, and ask your doctor or other care provider to review them with you.

## 2024-07-20 ENCOUNTER — HOSPITAL ENCOUNTER (EMERGENCY)
Facility: HOSPITAL | Age: 59
Discharge: HOME | End: 2024-07-20
Payer: MEDICAID

## 2024-07-20 VITALS
RESPIRATION RATE: 20 BRPM | TEMPERATURE: 98.3 F | HEIGHT: 66 IN | SYSTOLIC BLOOD PRESSURE: 134 MMHG | BODY MASS INDEX: 30.86 KG/M2 | HEART RATE: 86 BPM | DIASTOLIC BLOOD PRESSURE: 77 MMHG | WEIGHT: 192 LBS | OXYGEN SATURATION: 98 %

## 2024-07-20 DIAGNOSIS — R22.0 LIP SWELLING: Primary | ICD-10-CM

## 2024-07-20 PROCEDURE — 99283 EMERGENCY DEPT VISIT LOW MDM: CPT

## 2024-07-20 PROCEDURE — 6370000000 HC RX 637 (ALT 250 FOR IP): Performed by: HEALTH CARE PROVIDER

## 2024-07-20 RX ORDER — DIPHENHYDRAMINE HCL 25 MG
50 CAPSULE ORAL
Status: COMPLETED | OUTPATIENT
Start: 2024-07-20 | End: 2024-07-20

## 2024-07-20 RX ORDER — FAMOTIDINE 20 MG/1
20 TABLET, FILM COATED ORAL
Status: COMPLETED | OUTPATIENT
Start: 2024-07-20 | End: 2024-07-20

## 2024-07-20 RX ADMIN — FAMOTIDINE 20 MG: 20 TABLET ORAL at 13:12

## 2024-07-20 RX ADMIN — DIPHENHYDRAMINE HYDROCHLORIDE 50 MG: 25 CAPSULE ORAL at 13:12

## 2024-07-20 ASSESSMENT — ENCOUNTER SYMPTOMS
SHORTNESS OF BREATH: 0
DIARRHEA: 0
ABDOMINAL PAIN: 0
NAUSEA: 0
CHEST TIGHTNESS: 0
VOMITING: 0
COUGH: 0

## 2024-07-20 ASSESSMENT — PAIN - FUNCTIONAL ASSESSMENT: PAIN_FUNCTIONAL_ASSESSMENT: NONE - DENIES PAIN

## 2024-07-20 NOTE — DISCHARGE INSTRUCTIONS
Discontinue lisinopril and your steroid medication until seen by primary care    Return to ED if worsening symptoms

## 2024-07-20 NOTE — ED TRIAGE NOTES
Pt presents to ED for lower lip swelling. Pt woke up with facial swelling and lower lip. Pt able to speak clearly and maintain airway. Pt started new inhaler last month. Currently on medrol dose pack.

## 2024-07-21 NOTE — ED PROVIDER NOTES
or Co-signs this chart in the absence of a cardiologist.  na    SCREENINGS                 Medical Decision Making   I am the first provider for this patient.    I reviewed the vital signs, available nursing notes, past medical history, past surgical history, family history and social history.      Provider Notes (Medical Decision Making):     Lip swelling: Suspect this is secondary to lisinopril, patient woke up with this symptom at 530 this morning and it has been over 6 hours at this point, this is not an anaphylactic reaction.  No cutaneous symptoms no tongue swelling no airway involvement.  He is provided with Benadryl and famotidine in the emergency department and advised to discontinue lisinopril as well as Solu-Medrol since that is a new medication for the patient.  Follow-up with primary care to discuss blood pressure management and reevaluation.    ED Course:        1. Lip swelling        DISPOSITION Decision To Discharge 07/20/2024 01:47:21 PM      Follow-ups:  [facility] EMERGENCY DEPT  [address] [place] [id number]  [phone]    If symptoms worsen    Karla Pedro, APRN - NP  [address] [place] [id number] [phone]    Schedule an appointment as soon as possible for a visit   Discuss blood pressure medications      Orders Placed This Encounter   Medications    diphenhydrAMINE (BENADRYL) capsule 50 mg    famotidine (PEPCID) tablet 20 mg          Medication List        ASK your doctor about these medications      acetaminophen 500 MG tablet  Commonly known as: TYLENOL  Take 2 tablets by mouth every 6 hours as needed for Pain     * albuterol sulfate  (90 Base) MCG/ACT inhaler  Commonly known as: PROVENTIL;VENTOLIN;PROAIR     * albuterol sulfate  (90 Base) MCG/ACT inhaler  Commonly known as: PROVENTIL;VENTOLIN;PROAIR     aspirin 81 MG chewable tablet     DULoxetine 60 MG extended release capsule  Commonly known as: CYMBALTA     * gabapentin 600 MG tablet  Commonly known as:

## 2024-07-24 ENCOUNTER — OFFICE VISIT (OUTPATIENT)
Age: 59
End: 2024-07-24
Payer: MEDICAID

## 2024-07-24 VITALS
OXYGEN SATURATION: 96 % | BODY MASS INDEX: 31.02 KG/M2 | HEIGHT: 66 IN | SYSTOLIC BLOOD PRESSURE: 122 MMHG | HEART RATE: 76 BPM | DIASTOLIC BLOOD PRESSURE: 76 MMHG | RESPIRATION RATE: 18 BRPM | WEIGHT: 193 LBS | TEMPERATURE: 98.5 F

## 2024-07-24 DIAGNOSIS — R26.81 GAIT INSTABILITY: ICD-10-CM

## 2024-07-24 DIAGNOSIS — G95.9 CERVICAL MYELOPATHY (HCC): Primary | ICD-10-CM

## 2024-07-24 PROCEDURE — 72040 X-RAY EXAM NECK SPINE 2-3 VW: CPT | Performed by: PHYSICAL MEDICINE & REHABILITATION

## 2024-07-24 PROCEDURE — 3078F DIAST BP <80 MM HG: CPT | Performed by: PHYSICAL MEDICINE & REHABILITATION

## 2024-07-24 PROCEDURE — 99214 OFFICE O/P EST MOD 30 MIN: CPT | Performed by: PHYSICAL MEDICINE & REHABILITATION

## 2024-07-24 PROCEDURE — 3074F SYST BP LT 130 MM HG: CPT | Performed by: PHYSICAL MEDICINE & REHABILITATION

## 2024-07-24 RX ORDER — CHLORTHALIDONE 25 MG/1
TABLET ORAL
COMMUNITY
Start: 2024-04-30

## 2024-07-24 RX ORDER — LIDOCAINE 50 MG/G
PATCH TOPICAL
COMMUNITY
Start: 2024-03-26

## 2024-07-24 RX ORDER — OMEPRAZOLE 40 MG/1
CAPSULE, DELAYED RELEASE ORAL
COMMUNITY

## 2024-07-24 RX ORDER — BUDESONIDE, GLYCOPYRROLATE, AND FORMOTEROL FUMARATE 160; 9; 4.8 UG/1; UG/1; UG/1
AEROSOL, METERED RESPIRATORY (INHALATION)
COMMUNITY
Start: 2024-06-27

## 2024-07-24 RX ORDER — NAPROXEN 500 MG/1
TABLET ORAL
COMMUNITY
Start: 2024-06-27

## 2024-07-24 RX ORDER — IBUPROFEN 800 MG/1
TABLET ORAL
COMMUNITY
Start: 2024-04-30

## 2024-07-24 RX ORDER — TRAMADOL HYDROCHLORIDE 50 MG/1
TABLET ORAL
COMMUNITY
Start: 2024-06-27

## 2024-07-24 RX ORDER — SPIRONOLACTONE 25 MG/1
25 TABLET ORAL DAILY
COMMUNITY

## 2024-07-24 RX ORDER — SILDENAFIL 100 MG/1
TABLET, FILM COATED ORAL
COMMUNITY
Start: 2021-06-21

## 2024-07-24 RX ORDER — EMTRICITABINE AND TENOFOVIR ALAFENAMIDE 200; 25 MG/1; MG/1
TABLET ORAL
COMMUNITY
Start: 2024-05-23

## 2024-07-24 RX ORDER — ERGOCALCIFEROL 1.25 MG/1
CAPSULE ORAL
COMMUNITY
Start: 2024-06-27

## 2024-07-24 NOTE — PROGRESS NOTES
[facility]  [address] [place] [address], [address]   [place] [address]   Phone: [phone]   Fax: [phone]         Patient: Jorge Feng                                                                               MRN: [id number]         YOB: 1965           AGE: 57 y.o.               PCP: Karla Pedro NP   Date:  07/18/22     Reason for Consultation: low back pain      HPI:   Jorge Feng is a 57 y.o.  male with relevant PMH of HTN, HLD who presents with low back, and bilateral groin pain.  He was incarcerated for 44 years released 6/12/2020. Once he was released he reported b/l groin pain he is s/p  a left hernia- left scrotal hernai surgery in 2019.  He reeports low back pain radiating into bilateral legs and swelling in his legs.  . He had a CT scan of his lumbar spine 8/2021 which demonstrated degenerative changes and mild canal stenosis. I ordered an MRI of his lumbar spine but he did not have it done    He saw Dr. Barger who ordered an MRI of his cervical spine as well as he had brisk upper extremities reflexes and + Addison.            Neurologic symptoms: No numbness, tingling, weakness, bowel or bladder changes.  No recent falls        Location: The pain is located in the bilateral groin, low back pain radiating down bilateral legs   Radiation: The pain does radiate bilateral legs.     Pain Score: Currently: 10/10    Quality: Pain is of a Achy, Burning, Tight and Pulling quality.     Aggravating: Pain is exacerbated by walking and standing   Alleviating: The pain is alleviated by lying down      Prior Treatments:   Physical therapy: NO   Injections:NO      Previous Medications:  duloxetine 60mg, gabapentin 300mg, naprsoyn, tramadol    Current Medications: methocarbamol   Previous work-up has included:   3/5/2024- lumbar spine      FINDINGS: There is a dextroscoliosis involving the thoracolumbar spine. There  are large bridging left lateral

## 2024-07-24 NOTE — PROGRESS NOTES
Jorge Feng presents today for   Chief Complaint   Patient presents with    Back Problem    Pain    Back Pain              Is someone accompanying this pt? no    Is the patient using any DME equipment during OV? no    Depression Screening:       No data to display                Learning Assessment:  Failed to redirect to the Timeline version of the "[employer] SmartLink.    Abuse Screening:       No data to display                Fall Risk  Failed to redirect to the Timeline version of the "[employer] SmartLink.    OPIOID RISK TOOL  Failed to redirect to the Timeline version of the "[employer] SmartLink.    Coordination of Care:  1. Have you been to the ER, urgent care clinic since your last visit? no  Hospitalized since your last visit? no    2. Have you seen or consulted any other health care providers outside of the [facility] since your last visit? no Include any pap smears or colon screening. no